# Patient Record
Sex: MALE | Race: WHITE
[De-identification: names, ages, dates, MRNs, and addresses within clinical notes are randomized per-mention and may not be internally consistent; named-entity substitution may affect disease eponyms.]

---

## 2020-02-18 ENCOUNTER — HOSPITAL ENCOUNTER (EMERGENCY)
Dept: HOSPITAL 25 - ED | Age: 80
Discharge: HOME | End: 2020-02-18
Payer: MEDICARE

## 2020-02-18 VITALS — DIASTOLIC BLOOD PRESSURE: 80 MMHG | SYSTOLIC BLOOD PRESSURE: 155 MMHG

## 2020-02-18 DIAGNOSIS — Z88.8: ICD-10-CM

## 2020-02-18 DIAGNOSIS — R73.03: ICD-10-CM

## 2020-02-18 DIAGNOSIS — Z86.718: ICD-10-CM

## 2020-02-18 DIAGNOSIS — Z79.01: ICD-10-CM

## 2020-02-18 DIAGNOSIS — Z95.2: ICD-10-CM

## 2020-02-18 DIAGNOSIS — I10: ICD-10-CM

## 2020-02-18 DIAGNOSIS — E87.1: Primary | ICD-10-CM

## 2020-02-18 DIAGNOSIS — R60.0: ICD-10-CM

## 2020-02-18 DIAGNOSIS — N39.0: ICD-10-CM

## 2020-02-18 DIAGNOSIS — Z95.810: ICD-10-CM

## 2020-02-18 DIAGNOSIS — Z87.891: ICD-10-CM

## 2020-02-18 LAB
ALBUMIN SERPL BCG-MCNC: 4 G/DL (ref 3.2–5.2)
ALBUMIN/GLOB SERPL: 1.3 {RATIO} (ref 1–3)
ALP SERPL-CCNC: 67 U/L (ref 34–104)
ALT SERPL W P-5'-P-CCNC: 20 U/L (ref 7–52)
ANION GAP SERPL CALC-SCNC: 5 MMOL/L (ref 2–11)
AST SERPL-CCNC: 25 U/L (ref 13–39)
BASOPHILS # BLD AUTO: 0 10^3/UL (ref 0–0.2)
BUN SERPL-MCNC: 13 MG/DL (ref 6–24)
BUN/CREAT SERPL: 11.9 (ref 8–20)
CALCIUM SERPL-MCNC: 9.3 MG/DL (ref 8.6–10.3)
CHLORIDE SERPL-SCNC: 95 MMOL/L (ref 101–111)
EOSINOPHIL # BLD AUTO: 0.2 10^3/UL (ref 0–0.6)
GLOBULIN SER CALC-MCNC: 3.1 G/DL (ref 2–4)
GLUCOSE SERPL-MCNC: 114 MG/DL (ref 70–100)
HCO3 SERPL-SCNC: 27 MMOL/L (ref 22–32)
HCT VFR BLD AUTO: 42 % (ref 42–52)
HGB BLD-MCNC: 14.4 G/DL (ref 14–18)
LYMPHOCYTES # BLD AUTO: 0.9 10^3/UL (ref 1–4.8)
MAGNESIUM SERPL-MCNC: 1.9 MG/DL (ref 1.9–2.7)
MCH RBC QN AUTO: 31 PG (ref 27–31)
MCHC RBC AUTO-ENTMCNC: 35 G/DL (ref 31–36)
MCV RBC AUTO: 89 FL (ref 80–94)
MONOCYTES # BLD AUTO: 0.9 10^3/UL (ref 0–0.8)
NEUTROPHILS # BLD AUTO: 3.9 10^3/UL (ref 1.5–7.7)
NRBC # BLD AUTO: 0 10^3/UL
NRBC BLD QL AUTO: 0
PLATELET # BLD AUTO: 234 10^3/UL (ref 150–450)
POTASSIUM SERPL-SCNC: 4.1 MMOL/L (ref 3.5–5)
PROT SERPL-MCNC: 7.1 G/DL (ref 6.4–8.9)
RBC # BLD AUTO: 4.67 10^6 /UL (ref 4.18–5.48)
RBC UR QL AUTO: (no result)
SODIUM SERPL-SCNC: 127 MMOL/L (ref 135–145)
WBC # BLD AUTO: 5.9 10^3/UL (ref 3.5–10.8)
WBC UR QL AUTO: (no result)

## 2020-02-18 PROCEDURE — 87086 URINE CULTURE/COLONY COUNT: CPT

## 2020-02-18 PROCEDURE — 87186 SC STD MICRODIL/AGAR DIL: CPT

## 2020-02-18 PROCEDURE — 81015 MICROSCOPIC EXAM OF URINE: CPT

## 2020-02-18 PROCEDURE — 83735 ASSAY OF MAGNESIUM: CPT

## 2020-02-18 PROCEDURE — 84300 ASSAY OF URINE SODIUM: CPT

## 2020-02-18 PROCEDURE — 36415 COLL VENOUS BLD VENIPUNCTURE: CPT

## 2020-02-18 PROCEDURE — 96360 HYDRATION IV INFUSION INIT: CPT

## 2020-02-18 PROCEDURE — 80053 COMPREHEN METABOLIC PANEL: CPT

## 2020-02-18 PROCEDURE — 99284 EMERGENCY DEPT VISIT MOD MDM: CPT

## 2020-02-18 PROCEDURE — 85025 COMPLETE CBC W/AUTO DIFF WBC: CPT

## 2020-02-18 PROCEDURE — 87077 CULTURE AEROBIC IDENTIFY: CPT

## 2020-02-18 PROCEDURE — 81003 URINALYSIS AUTO W/O SCOPE: CPT

## 2020-02-18 NOTE — XMS REPORT
Patient Summary Document

 Created on:January 15, 2020



Patient:PARISH HOUSE Unknown

Sex:Male

:1940

External Reference #:[id number]





Demographics







 Address  [facility] [place] [address]

 

 Home Phone  [phone]

 

 Preferred Language  English

 

 Marital Status  Unknown

 

 [facility] Affiliation  Unknown

 

 Race  Unknown

 

 Ethnic Group  Unknown









Author







 Organization  [facility]









Support







 Name  Relationship  Address  Phone

 

 ZI, Unknown Name  NextOfBernardino  Unknown Address  [phone]









Care Team Providers







 Name  Role  Phone

 

 Unavailable  Unavailable  Unavailable









Problems







 Condition  Condition  Condition  Status  Onset  Resolution  Last  Treating  
Comments



 Name  Details  Category    Date  Date  Treatment  Clinician  



             Date    

 

 Hypertensiv  Hypertensiv  Diagnosis  Active        Elmer  



 e heart  e heart            Smith  



 disease  disease            [id number]  



 with heart  with heart              



 failure  failure              

 

 Chronic  Chronic  Diagnosis  Active        Elmer  



 systolic  systolic            Haider  



 (congestive  (congestive            [id number]  



 ) heart  ) heart              



 failure  failure              

 

 Paroxysmal  Paroxysmal  Diagnosis  Active        Elmer  



 atrial  atrial            Haider  



 fibrillatio  fibrillatio            [id number]  



 n  n              

 

 Type 2  Type 2  Diagnosis  Active        Elmer  



 diabetes  diabetes            Smith  



 mellitus  mellitus            [id number]  



 without  without              



 complicatio  complicatio              



 ns  ns              

 

 Diverticuli  Diverticuli  Diagnosis  Active        Elmer  



 tis of  tis of            Smith  



 intestine,  intestine,            [id number]  



 part  part              



 unspecified  unspecified              



 , without  , without              



 perforation  perforation              



 or abscess  or abscess              



 without  without              



 bleeding  bleeding              

 

 Anemia,  Anemia,  Diagnosis  Active        Elmer  



 unspecified  unspecified            Haider  



               [id number]  

 

 Benign  Benign  Diagnosis  Active        Elmer  



 prostatic  prostatic            Smith  



 hyperplasia  hyperplasia            [id number]  



 without  without              



 lower  lower              



 urinary  urinary              



 tract  tract              



 symptoms  symptoms              

 

 Lymphocytop  Lymphocytop  Diagnosis  Active        Elmer  



 enia  enia            Haider  



               [id number]  

 

 Encounter  Encounter  Diagnosis  Active        Elmer  



 for  alka Maloney  



 attention  attention            [id number]  



 to  to              



 colostomy  colostomy              

 

 Long term  Long term  Diagnosis  Active        Elmer  



 (current)  (current)            Haider  



 use of  use of            [id number]  



 anticoagula  anticoagula              



 nts  nts              

 

 Personal  Personal  Diagnosis  Active        Elmer  



 history of  history of            Haider  



 transient  transient            [id number]  



 ischemic  ischemic              



 attack  attack              



 (TIA), and  (TIA), and              



 cerebral  cerebral              



 infarction  infarction              



 without  without              



 residual  residual              



 deficits  deficits              

 

 Presence of  Presence of  Diagnosis  Active        Elmer  



 cardiac  cardiac            Haider  



 pacemaker  pacemaker            [id number]  

 

 Presence of  Presence of  Diagnosis  Active        Elmer  



 prosthetic  prosthetic            Smith  



 heart valve  heart valve            [id number]  

 

 Personal  Personal  Diagnosis  Active        Elmer  



 history of  history of            Smith  



 nicotine  nicotine            [id number]  



 dependence  dependence              

 

 Problems  Problems  Diagnosis  Active        Elmer  



 related to  related to            [place]ider  



 living  living            [id number]  



 alone  alone              

 

 Pain  frequent  Pain Mgmt  Resolve  2019    Patricia  



   pain    d    10:55:00    Olyphant  



         10:30:      [id number]  



         00        

 

 Cardio  edema  Cardiovasc  Active  2019      Patricia  



     ular          Olyphant  



         10:30:      [id number]  



         00        

 

 Cardio  knowledge/s  Cardiovasc  Active  2019      Patricia  



   kill  ular          [place]phant  



   deficit: pt      10:30:      [id number]  



         00        

 

 Respiratory  dyspnea  Respirator  Resolve  2019    Patricia  



   present  y  d    10:45:00    Olyphant  



         10:30:      [id number]  



         00        

 

 Endo/Hema  newly  Endo/Hema  Resolve  2019    Patricia  



   diagnosed    d    09:45:00    Olyphant  



   diabetes      10:30:      [id number]  



         00        

 

 Endo/Hema  diabetic  Endo/Hema  Resolve  2019    Patricia  



   foot care    d    09:45:00    Olyphant  



         10:30:      [id number]  



         00        

 

 Endo/Hema  anti-coagul  Endo/Hema  Resolve  2019    Patricia  



   ation    d    09:45:00    Olyphant  



   therapy      10:30:      [id number]  



         00        

 

 Sensory  impaired  Sensory  Resolve  2019    Patricia  



   hearing    d    10:45:00    Olyphant  



         10:30:      [id number]  



         00        

 

 Integument  skin  Integument  Resolve  2019    Patricia  



   integrity    d    11:40:00    Olyphant  



   risk      10:30:      [id number]  



         00        

 

 Nutrition  nutritional  Nutrition  Resolve  2019    Patricia  



   restriction    d    11:40:00    Olyphant  



   s      10:30:      [id number]  



         00        

 

 Elimination  ostomy  Eliminatio  Resolve  2019    Patricia  



   present  n  d    11:40:00    [place]  



         10:30:      [id number]  



         00        

 

 Neuro  confusion  Neuro/Emot  Resolve  2019    Patricia  



   present  ion  d    10:45:00    O[place]phant  



         10:30:      [id number]  



         00        

 

 Neuro  impaired  Neuro/Emot  Resolve  2019    Patricia  



   decision-ma  ion  d    10:45:00    O[place]phant  



   felicia      10:30:      [id number]  



         00        

 

 Activity  ADL  Activity  Resolve  2019    Patricia  



   assistance    d    10:45:00    Harper  



   required      10:30:      [id number]  



         00        

 

 Activity  self-care  Activity  Resolve  2019    Patricia  



   deficit    d    10:45:00    Harper  



         10:30:      [id number]  



         00        

 

 Safety  structural  Safety  Resolve  2019    Patricia  



   barriers    d    10:55:00    [place]phant  



   present      10:30:      [id number]  



         00        

 

 Safety  fall risk  Safety  Resolve  2019    Patricia  



   factor    d    10:55:00    O[place]phant  



   present      10:30:      [id number]  



         00        

 

 Safety  risk for  Safety  Resolve  2019    Patricia  



   hospitaliza    d    10:55:00    [place]  



   tion      10:30:      [id number]  



         00        

 

 Safety  can be left  Safety  Active  2019      Patricia  



   alone for            Olyphant  



   only short      10:30:      [id number]  



   periods      00        

 

 Safety  safety  Safety  Resolve  2019    QUALITY  



   hazards    d    10:55:00    REALTIME  



   present      10:30:        



         00        

 

 Medication  oral med  Meds  Resolve  2019    Patricia  



   assistance    d    10:55:00    Harper  



   required      10:30:      [id number]  



         00        

 

 Medication  knowledge/s  Meds  Resolve  2019    Patricia  



   kill    d    10:55:00    Olyphant  



   deficit: pt      10:30:      [id number]  



         00        

 

 Musculoskel  transfer  Musculoske  Resolve  2019    Patricia  



 etal  assistance  letal  d    10:45:00    Harper  



   required      10:30:      [id number]  



         00        

 

 Musculoskel  requires  Musculoske  Resolve  2019    Patricia  



 etal  human  letal  d    10:45:00    Harper  



   assist to      10:30:      [id number]  



   leave home      00        

 

 Cath/Ostomy  k/s  Cath\Ostom  Resolve  2019    Patricia  



 /GI  deficit:  y\GI Care  d    11:40:00    Olyphant  



   cath/ost/GI      10:30:      [id number]  



   care - pt      00        

 

 Balance/End  balance/  PT/OT:  Resolve  2019    Shyam kimance  rdination  Balance/En  d    10:45:00    Kobziewicz  



   deficit  durance    12:07:      [id number]  



         00        

 

 OT: Self  self-care  OT:  Resolve  2019    Shyam  



 Care  deficit  Self-Care  d    10:45:00    Kobziewicz  



         12:07:      [id number]  



         00        

 

 Gait/Locomo  stair  PT/OT:  Resolve  2019    Shyam  



 tion  management  Gait/Locom  d    10:45:00    Kobziewicz  



 problems  req  otion    12:07:      [id number]  



         00        

 

 Gait/Locomo  gait  PT/OT:  Resolve  2019    Shyam  



 tion  deficit  Gait/Locom  d    10:45:00    Kobziewicz  



 problems    otion    12:07:      [id number]  



         00        

 

 Elimination  ostomy  Eliminatio  Resolve  2019    Elmer  



   present  n  d  1-15  10:55:00    Haider  



         09:55:      [id number]  



         00        

 

 Elimination  urinary  Eliminatio  Resolve  2019    Elmer  



   incontinenc  n  d  2-11  10:45:00    Haider gill      10:45:      [id number]  



         00        

 

 Elimination  ostomy  Eliminatio  Resolve  2019    Elmer  



   present  n  d  2-11  10:45:00    [place]ider  



         10:45:      [id number]  



         00        

 

 Safety  risk for  Safety  Active  2019      Elmer  



   hospitaliza            [place]ider  



   tion      10:50:      [id number]  



         00        

 

 Activity  ADL  Activity  Unknown  2019      Elmer  



   assistance            Maloney  



   required      10:45:      [id number]  



         00        

 

 Safety  fall risk  Safety  Active  2019      Elmer  



   factor            Maloney  



   present      10:45:      [id number]  



         00        







Allergies, Adverse Reactions, Alerts







 Allergy  Allergy  Status  Severity  Reaction(s)  Onset  Inactive  Treating  
Comments



 Name  Type        Date  Date  Clinician  

 

 Unknown  None  Active  Unknown  None      Unknown  No Known



                 Allergies



                 For This



                 Patient







Medications







 Ordered  Filled  Start  Stop  Current  Ordering  Indication  Dosage  Frequency
  Signature  Comments  Components



 Medication  Medication  Date  Date  Medication?  Clinician        (SIG)    



 Name  Name                    

 

 Eliquis 2.5  Eliquis 2.5  2019    Yes  Coolidge    Unknown  Unknown      



 mg tablet  mg tablet        Jen JEFF            

 

 metoprolol  metoprolol  2019    Yes  Coolidge    Unknown  Unknown      



 tartrate  tartrate  1-04      Jen JEFF            



 100 mg  100 mg                    



 tablet  tablet                    

 

 Cardizem CD  Cardizem CD  2019    Yes  [place]idge    Unknown  Unknown      



 120 mg  120 mg  1-04      eJn JEFF            



 capsule,ext  capsule,ext                    



 ended  ended                    



 release  release                    

 

 multivitami  multivitami  2019    Yes  [place]    Unknown  Unknown      



 n with  n with  -      Jen JEFF            



 minerals  minerals                    



 tablet  tablet                    

 

 Co Q-10 150  Co Q-10 150  2019    Yes  [place]    Unknown  Unknown      



 mg capsule  mg capsule  1-04      Jen JEFF            

 

 niacin ER  niacin ER  2019    Yes  [place]idge    Unknown  Unknown      



 250 mg  250 mg  -04      Jen JEFF            



 tablet,exte  tablet,exte                    



 nded  nded                    



 release  release                    

 

 iron 27mg  iron 27mg  2019    Yes  [place]idge    Unknown  Unknown      



     1-04      Jen JEFF            

 

 pravastatin  pravastatin  2019    Yes  Coolidge    Unknown  Unknown      



 40 mg  40 mg  -15      Jen JEFF            



 tablet  tablet                    







Vital Signs







 Vital Name  Observation Time  Observation Value  Comments

 

 SYSTOLIC mm[Hg]  2020 18:09:52  146 mm[Hg] mm[Hg]  Method: Sit

 

 SYSTOLIC mm[Hg]  2019 18:08:41  128 mm[Hg] mm[Hg]  Method: Stand

 

 DIASTOLIC mm[Hg]  2020 18:09:52  72 mm[Hg] mm[Hg]  Method: Sit

 

 DIASTOLIC mm[Hg]  2019 18:08:41  70 mm[Hg] mm[Hg]  Method: Stand

 

 PULSE  2020 18:09:52  76 /min /min  

 

 RESP RATE  2020 18:09:52  16 /min /min  

 

 TEMP  2020 18:09:52  98.1 [degF]  







Procedures

This patient has no known procedures.



Results

This patient has no known results.

## 2020-02-18 NOTE — XMS REPORT
Patient Summary Document

 Created on:2020



Patient:PARISH HOUSE Unknown

Sex:Male

:1940

External Reference #:[id number]





Demographics







 [place] [address]

 

 Home Phone  [phone]

 

 Preferred Language  English

 

 Marital Status  Unknown

 

 Sabianist Affiliation  Unknown

 

 Race  Unknown

 

 Ethnic Group  Unknown









Author







 Organization  [facility]









Support







 Name  Relationship  Address  Phone

 

 ZI, Unknown Name  NextOfBernardino  Unknown Address  [phone]









Care Team Providers







 Name  Role  Phone

 

 Unavailable  Unavailable  Unavailable









Problems







 Condition  Condition  Condition  Status  Onset  Resolution  Last  Treating  
Comments



 Name  Details  Category    Date  Date  Treatment  Clinician  



             Date    

 

 Hypertensiv  Hypertensiv  Diagnosis  Active        Elmer  



 e heart  e heart            Smith  



 disease  disease            [id number]  



 with heart  with heart              



 failure  failure              

 

 Chronic  Chronic  Diagnosis  Active        Elmer  



 systolic  systolic            [place]ider  



 (congestive  (congestive            [id number]  



 ) heart  ) heart              



 failure  failure              

 

 Paroxysmal  Paroxysmal  Diagnosis  Active        Elmer  



 atrial  atrial            Haider  



 fibrillatio  fibrillatio            [id number]  



 n  n              

 

 Type 2  Type 2  Diagnosis  Active        Elmer  



 diabetes  diabetes            Smith  



 mellitus  mellitus            [id number]  



 without  without              



 complicatio  complicatio              



 ns  ns              

 

 Diverticuli  Diverticuli  Diagnosis  Active        Elmer  



 tis of  tis of            Smith  



 intestine,  intestine,            [id number]  



 part  part              



 unspecified  unspecified              



 , without  , without              



 perforation  perforation              



 or abscess  or abscess              



 without  without              



 bleeding  bleeding              

 

 Anemia,  Anemia,  Diagnosis  Active        Elmer  



 unspecified  unspecified            [place]ider  



               [id number]  

 

 Benign  Benign  Diagnosis  Active        Elmre  



 prostatic  prostatic            Smith  



 hyperplasia  hyperplasia            [id number]  



 without  without              



 lower  lower              



 urinary  urinary              



 tract  tract              



 symptoms  symptoms              

 

 Lymphocytop  Lymphocytop  Diagnosis  Active        Elmer  



 enia  enia            Haider  



               [id number]  

 

 Encounter  Encounter  Diagnosis  Active        Elmer  



 for  alka Maloney  



 attention  attention            [id number]  



 to  to              



 colostomy  colostomy              

 

 Long term  Long term  Diagnosis  Active        Elmer  



 (current)  (current)            Haider  



 use of  use of            [id number]  



 anticoagula  anticoagula              



 nts  nts              

 

 Personal  Personal  Diagnosis  Active        Elmer  



 history of  history of            [place]ider  



 transient  transient            [id number]  



 ischemic  ischemic              



 attack  attack              



 (TIA), and  (TIA), and              



 cerebral  cerebral              



 infarction  infarction              



 without  without              



 residual  residual              



 deficits  deficits              

 

 Presence of  Presence of  Diagnosis  Active        Elmer  



 cardiac  cardiac            [place]ider  



 pacemaker  pacemaker            [id number]  

 

 Presence of  Presence of  Diagnosis  Active        Elmer  



 prosthetic  prosthetic            Smith  



 heart valve  heart valve            [id number]  

 

 Personal  Personal  Diagnosis  Active        Elmer  



 history of  history of            Smith  



 nicotine  nicotine            [id number]  



 dependence  dependence              

 

 Problems  Problems  Diagnosis  Active        Elmer  



 related to  related to            [place]  



 living  living            [id number]  



 alone  alone              

 

 Pain  frequent  Pain Mgmt  Resolve  2019    Patricia  



   pain    d    10:55:00    McDaniels  



         10:30:      [id number]  



         00        

 

 Cardio  edema  Cardiovasc  Active  2019      Patricia  



     ular          McDaniels  



         10:30:      [id number]  



         00        

 

 Cardio  knowledge/s  Cardiovasc  Active  2019      Patricia  



   kill  ular          McDaniels  



   deficit: pt      10:30:      [id number]  



         00        

 

 Respiratory  dyspnea  Respirator  Resolve  2019    Patricia  



   present  y  d    10:45:00    McDaniels  



         10:30:      [id number]  



         00        

 

 Endo/Hema  newly  Endo/Hema  Resolve  2019    Patricia  



   diagnosed    d    09:45:00    McDaniels  



   diabetes      10:30:      [id number]  



         00        

 

 Endo/Hema  diabetic  Endo/Hema  Resolve  2019    Patricia  



   foot care    d    09:45:00    McDaniels  



         10:30:      [id number]  



         00        

 

 Endo/Hema  anti-coagul  Endo/Hema  Resolve  2019    Patricia  



   ation    d    09:45:00    McDaniels  



   therapy      10:30:      [id number]  



         00        

 

 Sensory  impaired  Sensory  Resolve  2019    Patricia  



   hearing    d    10:45:00    McDaniels  



         10:30:      [id number]  



         00        

 

 Integument  skin  Integument  Resolve  2019    Patricia  



   integrity    d    11:40:00    McDaniels  



   risk      10:30:      [id number]  



         00        

 

 Nutrition  nutritional  Nutrition  Resolve  2019    Patricia  



   restriction    d    11:40:00    McDaniels  



   s      10:30:      [id number]  



         00        

 

 Elimination  ostomy  Eliminatio  Resolve  2019    Patricia  



   present  n  d    11:40:00    McDaniels  



         10:30:      [id number]  



         00        

 

 Neuro  confusion  Neuro/Emot  Resolve  2019    Patricia  



   present  ion  d    10:45:00    McDaniels  



         10:30:      [id number]  



         00        

 

 Neuro  impaired  Neuro/Emot  Resolve  2019    Patricia  



   decision-ma  ion  d    10:45:00    McDaniels  



   felicia      10:30:      [id number]  



         00        

 

 Activity  ADL  Activity  Resolve  2019    Patricia  



   assistance    d    10:45:00    Harper  



   required      10:30:      [id number]  



         00        

 

 Activity  self-care  Activity  Resolve  2019    Patricia  



   deficit    d    10:45:00    Harper  



         10:30:      [id number]  



         00        

 

 Safety  structural  Safety  Resolve  2019    Patricia  



   barriers    d    10:55:00    McDaniels  



   present      10:30:      [id number]  



         00        

 

 Safety  fall risk  Safety  Resolve  2019    Patricia  



   factor    d    10:55:00    McDaniels  



   present      10:30:      [id number]  



         00        

 

 Safety  risk for  Safety  Resolve  2019    Patricia  



   hospitaliza    d    10:55:00    McDaniels  



   tion      10:30:      [id number]  



         00        

 

 Safety  can be left  Safety  Active  2019      Patricia  



   alone for            McDaniels  



   only short      10:30:      [id number]  



   periods      00        

 

 Safety  safety  Safety  Resolve  2019    QUALITY  



   hazards    d    10:55:00    REALTIME  



   present      10:30:        



         00        

 

 Medication  oral med  Meds  Resolve  2019    Patricia  



   assistance    d    10:55:00    Harper  



   required      10:30:      [id number]  



         00        

 

 Medication  knowledge/s  Meds  Resolve  2019    Patricia  



   kill    d    10:55:00    McDaniels  



   deficit: pt      10:30:      [id number]  



         00        

 

 Musculoskel  transfer  Musculoske  Resolve  2019    Patricia  



 etal  assistance  letal  d    10:45:00    Harper  



   required      10:30:      [id number]  



         00        

 

 Musculoskel  requires  Musculoske  Resolve  2019    Patricia  



 etal  human  letal  d    10:45:00    [place]  



   assist to      10:30:      [id number]  



   leave home      00        

 

 Cath/Ostomy  k/s  Cath\Ostom  Resolve  2019    Patricia  



 /GI  deficit:  y\GI Care  d    11:40:00    McDaniels  



   cath/ost/GI      10:30:      [id number]  



   care - pt      00        

 

 Balance/End  balance/  PT/OT:  Resolve  2019    Shyam kimance  rdination  Balance/En  d    10:45:00    Kobziewicz  



   deficit  durance    12:07:      [id number]  



         00        

 

 OT: Self  self-care  OT:  Resolve  2019    Shyam  



 Care  deficit  Self-Care  d    10:45:00    Kobziewicz  



         12:07:      [id number]  



         00        

 

 Gait/Locomo  stair  PT/OT:  Resolve  2019    Shyam  



 tion  management  Gait/Locom  d    10:45:00    Kobziewicz  



 problems  req  otion    12:07:      [id number]  



         00        

 

 Gait/Locomo  gait  PT/OT:  Resolve  2019    Shyam  



 tion  deficit  Gait/Locom  d    10:45:00    Kobziewicz  



 problems    otion    12:07:      [id number]  



         00        

 

 Elimination  ostomy  Eliminatio  Resolve  2019    Elmer  



   present  n  d  1-15  10:55:00    [place]  



         09:55:      [id number]  



         00        

 

 Elimination  urinary  Eliminatio  Resolve  2019    Elmer  



   incontinenc  n  d  2-11  10:45:00    [place] gill      10:45:      [id number]  



         00        

 

 Elimination  ostomy  Eliminatio  Resolve  2019    Elmer  



   present  n  d  2-11  10:45:00    [place]  



         10:45:      [id number]  



         00        

 

 Safety  risk for  Safety  Active  2019      Elmer  



   hospitaliza            [place]  



   tion      10:50:      [id number]  



         00        

 

 Activity  ADL  Activity  Unknown  2019      Elmer  



   assistance            Maloney  



   required      10:45:      [id number]  



         00        

 

 Safety  fall risk  Safety  Active  2019      Elmer  



   factor            Maloney  



   present      10:45:      [id number]  



         00        







Allergies, Adverse Reactions, Alerts







 Allergy  Allergy  Status  Severity  Reaction(s)  Onset  Inactive  Treating  
Comments



 Name  Type        Date  Date  Clinician  

 

 Unknown  None  Active  Unknown  None      Unknown  No Known



                 Allergies



                 For This



                 Patient







Medications







 Ordered  Filled  Start  Stop  Current  Ordering  Indication  Dosage  Frequency
  Signature  Comments  Components



 Medication  Medication  Date  Date  Medication?  Clinician        (SIG)    



 Name  Name                    

 

 Eliquis 2.5  Eliquis 2.5  2019    Yes  [place]    Unknown  Unknown      



 mg tablet  mg tablet        Jen JEFF            

 

 metoprolol  metoprolol  2019    Yes  [place]    Unknown  Unknown      



 tartrate  tartrate  1-04      Jen JEFF            



 100 mg  100 mg                    



 tablet  tablet                    

 

 Cardizem CD  Cardizem CD  2019    Yes  [place]    Unknown  Unknown      



 120 mg  120 mg  1-04      Jen JEFF            



 capsule,ext  capsule,ext                    



 ended  ended                    



 release  release                    

 

 multivitami  multivitami  2019    Yes  [place]    Unknown  Unknown      



 n with  n with  -      Jen JEFF            



 minerals  minerals                    



 tablet  tablet                    

 

 Co Q-10 150  Co Q-10 150  2019    Yes  [place]    Unknown  Unknown      



 mg capsule  mg capsule  -04      Jen JEFF            

 

 niacin ER  niacin ER  2019    Yes  [place]    Unknown  Unknown      



 250 mg  250 mg  -04      Jen JEFF            



 tablet,exte  tablet,exte                    



 nded  nded                    



 release  release                    

 

 iron 27mg  iron 27mg  2019    Yes  [place]    Unknown  Unknown      



     1-04      Jen JEFF            

 

 pravastatin  pravastatin  2019    Yes  [place]    Unknown  Unknown      



 40 mg  40 mg  -15      Jen JEFF            



 tablet  tablet                    







Vital Signs







 Vital Name  Observation Time  Observation Value  Comments

 

 SYSTOLIC mm[Hg]  2020 18:09:45  112 mm[Hg] mm[Hg]  Method: Sit

 

 SYSTOLIC mm[Hg]  2019 18:08:41  128 mm[Hg] mm[Hg]  Method: Stand

 

 DIASTOLIC mm[Hg]  2020 18:09:45  68 mm[Hg] mm[Hg]  Method: Sit

 

 DIASTOLIC mm[Hg]  2019 18:08:41  70 mm[Hg] mm[Hg]  Method: Stand

 

 PULSE  2020 18:09:45  72 /min /min  

 

 RESP RATE  2020 18:09:45  16 /min /min  

 

 TEMP  2020 18:09:45  98.1 [degF]  







Procedures

This patient has no known procedures.



Results

This patient has no known results.

## 2020-02-18 NOTE — XMS REPORT
Patient Summary Document

 Created on:2020



Patient:PARISH HOUSE Unknown

Sex:Male

:1940

External Reference #:[id number]





Demographics







 Address  [facility] [address] [place] [address]

 

 Home Phone  [phone]

 

 Preferred Language  English

 

 Marital Status  Unknown

 

 Mosque Affiliation  Unknown

 

 Race  Unknown

 

 Ethnic Group  Unknown









Author







 Organization  [facility]









Support







 Name  Relationship  Address  Phone

 

 ZI, Unknown Name  NextOfBernardino  Unknown Address  [phone]









Care Team Providers







 Name  Role  Phone

 

 Unavailable  Unavailable  Unavailable









Problems







 Condition  Condition  Condition  Status  Onset  Resolution  Last  Treating  
Comments



 Name  Details  Category    Date  Date  Treatment  Clinician  



             Date    

 

 Hypertensiv  Hypertensiv  Diagnosis  Active        Elmer  



 e heart  e heart            Smith  



 disease  disease            [id number]  



 with heart  with heart              



 failure  failure              

 

 Chronic  Chronic  Diagnosis  Active        Elmer  



 systolic  systolic            Haider  



 (congestive  (congestive            [id number]  



 ) heart  ) heart              



 failure  failure              

 

 Paroxysmal  Paroxysmal  Diagnosis  Active        Elmer  



 atrial  atrial            Haider  



 fibrillatio  fibrillatio            [id number]  



 n  n              

 

 Type 2  Type 2  Diagnosis  Active        Elmer  



 diabetes  diabetes            Smith  



 mellitus  mellitus            [id number]  



 without  without              



 complicatio  complicatio              



 ns  ns              

 

 Diverticuli  Diverticuli  Diagnosis  Active        Elmer  



 tis of  tis of            Smith  



 intestine,  intestine,            [id number]  



 part  part              



 unspecified  unspecified              



 , without  , without              



 perforation  perforation              



 or abscess  or abscess              



 without  without              



 bleeding  bleeding              

 

 Anemia,  Anemia,  Diagnosis  Active        Elmer  



 unspecified  unspecified            Haider  



               [id number]  

 

 Benign  Benign  Diagnosis  Active        Elmer  



 prostatic  prostatic            Smith  



 hyperplasia  hyperplasia            [id number]  



 without  without              



 lower  lower              



 urinary  urinary              



 tract  tract              



 symptoms  symptoms              

 

 Lymphocytop  Lymphocytop  Diagnosis  Active        Elmer  



 enia  enia            Haider  



               [id number]  

 

 Encounter  Encounter  Diagnosis  Active        Elmer  



 for  alka Maloney  



 attention  attention            [id number]  



 to  to              



 colostomy  colostomy              

 

 Long term  Long term  Diagnosis  Active        Elmer  



 (current)  (current)            Haider  



 use of  use of            [id number]  



 anticoagula  anticoagula              



 nts  nts              

 

 Personal  Personal  Diagnosis  Active        Elmer  



 history of  history of            Haider  



 transient  transient            [id number]  



 ischemic  ischemic              



 attack  attack              



 (TIA), and  (TIA), and              



 cerebral  cerebral              



 infarction  infarction              



 without  without              



 residual  residual              



 deficits  deficits              

 

 Presence of  Presence of  Diagnosis  Active        Elmer  



 cardiac  cardiac            [place]ider  



 pacemaker  pacemaker            [id number]  

 

 Presence of  Presence of  Diagnosis  Active        Elemr  



 prosthetic  prosthetic            Smith  



 heart valve  heart valve            [id number]  

 

 Personal  Personal  Diagnosis  Active        Elmer  



 history of  history of            Smith  



 nicotine  nicotine            [id number]  



 dependence  dependence              

 

 Problems  Problems  Diagnosis  Active        Elmer  



 related to  related to            [place]  



 living  living            [id number]  



 alone  alone              

 

 Pain  frequent  Pain Mgmt  Resolve  2019    Patricia  



   pain    d    10:55:00    Traver  



         10:30:      [id number]  



         00        

 

 Cardio  edema  Cardiovasc  Active  2019      Patricia  



     ular          Traver  



         10:30:      [id number]  



         00        

 

 Cardio  knowledge/s  Cardiovasc  Active  2019      Patricia  



   kill  ular          Traver  



   deficit: pt      10:30:      [id number]  



         00        

 

 Respiratory  dyspnea  Respirator  Resolve  2019    Patricia  



   present  y  d    10:45:00    Traver  



         10:30:      [id number]  



         00        

 

 Endo/Hema  newly  Endo/Hema  Resolve  2019    Patricia  



   diagnosed    d    09:45:00    Traver  



   diabetes      10:30:      [id number]  



         00        

 

 Endo/Hema  diabetic  Endo/Hema  Resolve  2019    Patricia  



   foot care    d    09:45:00    Traver  



         10:30:      [id number]  



         00        

 

 Endo/Hema  anti-coagul  Endo/Hema  Resolve  2019    Patricia  



   ation    d    09:45:00    Traver  



   therapy      10:30:      [id number]  



         00        

 

 Sensory  impaired  Sensory  Resolve  2019    Patricia  



   hearing    d    10:45:00    Traver  



         10:30:      [id number]  



         00        

 

 Integument  skin  Integument  Resolve  2019    Patricia  



   integrity    d    11:40:00    Traver  



   risk      10:30:      [id number]  



         00        

 

 Nutrition  nutritional  Nutrition  Resolve  2019    Patricia  



   restriction    d    11:40:00    Traver  



   s      10:30:      [id number]  



         00        

 

 Elimination  ostomy  Eliminatio  Resolve  2019    Patricia  



   present  n  d    11:40:00    Traver  



         10:30:      [id number]  



         00        

 

 Neuro  confusion  Neuro/Emot  Resolve  2019    Patricia  



   present  ion  d    10:45:00    Traver  



         10:30:      [id number]  



         00        

 

 Neuro  impaired  Neuro/Emot  Resolve  2019    Patricia  



   decision-ma  ion  d    10:45:00    Traver  



   felicia      10:30:      [id number]  



         00        

 

 Activity  ADL  Activity  Resolve  2019    Patricia  



   assistance    d    10:45:00    Harper  



   required      10:30:      [id number]  



         00        

 

 Activity  self-care  Activity  Resolve  2019    Patricia  



   deficit    d    10:45:00    Harper  



         10:30:      [id number]  



         00        

 

 Safety  structural  Safety  Resolve  2019    Patricia  



   barriers    d    10:55:00    Traver  



   present      10:30:      [id number]  



         00        

 

 Safety  fall risk  Safety  Resolve  2019    Patricia  



   factor    d    10:55:00    Traver  



   present      10:30:      [id number]  



         00        

 

 Safety  risk for  Safety  Resolve  2019    Patricia  



   hospitaliza    d    10:55:00    Traver  



   tion      10:30:      [id number]  



         00        

 

 Safety  can be left  Safety  Active  2019      Patricia  



   alone for            Traver  



   only short      10:30:      [id number]  



   periods      00        

 

 Safety  safety  Safety  Resolve  2019    QUALITY  



   hazards    d    10:55:00    REALTIME  



   present      10:30:        



         00        

 

 Medication  oral med  Meds  Resolve  2019    Patricia  



   assistance    d    10:55:00    Harper  



   required      10:30:      [id number]  



         00        

 

 Medication  knowledge/s  Meds  Resolve  2019    Patricia  



   kill    d    10:55:00    Traver  



   deficit: pt      10:30:      [id number]  



         00        

 

 Musculoskel  transfer  Musculoske  Resolve  2019    Patricia  



 etal  assistance  letal  d    10:45:00    Harper  



   required      10:30:      [id number]  



         00        

 

 Musculoskel  requires  Musculoske  Resolve  2019    Patricia  



 etal  human  letal  d    10:45:00    Harper  



   assist to      10:30:      [id number]  



   leave home      00        

 

 Cath/Ostomy  k/s  Cath\Ostom  Resolve  2019    Patricia  



 /GI  deficit:  y\GI Care  d    11:40:00    Traver  



   cath/ost/GI      10:30:      [id number]  



   care - pt      00        

 

 Balance/End  balance/  PT/OT:  Resolve  2019    Shyam  



 urance  rdination  Balance/En  d    10:45:00    Mary Anneewicz  



   deficit  durance    12:07:      [id number]  



         00        

 

 OT: Self  self-care  OT:  Resolve  2019    Shyam  



 Care  deficit  Self-Care  d    10:45:00    Kobziewicz  



         12:07:      [id number]  



         00        

 

 Gait/Locomo  stair  PT/OT:  Resolve  2019    Shyam  



 tion  management  Gait/Locom  d    10:45:00    Kobziewicz  



 problems  req  otion    12:07:      [id number]  



         00        

 

 Gait/Locomo  gait  PT/OT:  Resolve  2019    Shyam  



 tion  deficit  Gait/Locom  d    10:45:00    Kobziewicz  



 problems    otion    12:07:      [id number]  



         00        

 

 Elimination  ostomy  Eliminatio  Resolve  2019    Elmer  



   present  n  d  1-15  10:55:00    Haider  



         09:55:      [id number]  



         00        

 

 Elimination  urinary  Eliminatio  Resolve  2019    Elmer  



   incontinenc  n  d  2-  10:45:00    Ha[place] gill      10:45:      [id number]  



         00        

 

 Elimination  ostomy  Eliminatio  Resolve  2019    Elmer  



   present  n  d  2-11  10:45:00    [place]  



         10:45:      [id number]  



         00        

 

 Safety  risk for  Safety  Active  2019      Elmer  



   hospitaliza            [place]  



   tion      10:50:      [id number]  



         00        

 

 Activity  ADL  Activity  Unknown  2019      Elmer  



   assistance            Maloney  



   required      10:45:      [id number]  



         00        

 

 Safety  fall risk  Safety  Active  2019      Elmer  



   factor            Maloney  



   present      10:45:      [id number]  



         00        

 

 Sensory  impaired  Sensory  Active  -      Cherrise  



   hearing            Ramona  



         11:30:      [id number]  



         00        

 

 Elimination  urinary  Eliminatio  Active  -0      Cherrise  



   incontinenc  n          [place]  



   e      11:30:      [id number]  



         00        

 

 Elimination  urinary  Eliminatio  Active        Cherrise  



   urgency  n          [place]  



         11:30:      [id number]  



         00        

 

 Elimination  ostomy  Eliminatio  Active  -0      Cherrise  



   present  n          [place]  



         11:30:      [id number]  



         00        

 

 Neuro  impaired  Neuro/Emot  Active        Cherrise  



   decision-ma  ion          [place]  



   felicia      11:30:      [id number]  



         00        

 

 Safety  structural  Safety  Active        Cherrise  



   barriers            [place]  



   present      11:30:      [id number]  



         00        

 

 Safety  sanitation  Safety  Active        Cherrise  



   hazards            [place]  



   present      11:30:      [id number]  



         00        

 

 Musculoskel  requires  Musculoske  Active        Cherrise  



 etal  human  letal          [place]  



   assist to      11:30:      [id number]  



   leave home      00        







Allergies, Adverse Reactions, Alerts







 Allergy  Allergy  Status  Severity  Reaction(s)  Onset  Inactive  Treating  
Comments



 Name  Type        Date  Date  Clinician  

 

 Unknown  None  Active  Unknown  None      Unknown  No Known



                 Allergies



                 For This



                 Patient







Medications







 Ordered  Filled  Start  Stop  Current  Ordering  Indication  Dosage  Frequency
  Signature  Comments  Components



 Medication  Medication  Date  Date  Medication?  Clinician        (SIG)    



 Name  Name                    

 

 Eliquis 2.5  Eliquis 2.5  2019    Yes  Holbrook    Unknown  Unknown      



 mg tablet  mg tablet  1-04      Jen JEFF            

 

 metoprolol  metoprolol  2019    Yes  Holbrook    Unknown  Unknown      



 tartrate  tartrate  1-04      Jen JEFF            



 100 mg  100 mg                    



 tablet  tablet                    

 

 Cardizem CD  Cardizem CD  2019    Yes  Holbrook    Unknown  Unknown      



 120 mg  120 mg  1-04      Jen JEFF            



 capsule,ext  capsule,ext                    



 ended  ended                    



 release  release                    

 

 multivitami  multivitami  2019    Yes  Holbrook    Unknown  Unknown      



 n with  n with  -      Jen JEFF            



 minerals  minerals                    



 tablet  tablet                    

 

 Co Q-10 150  Co Q-10 150  2019    Yes  Holbrook    Unknown  Unknown      



 mg capsule  mg capsule  1-04      Jen JEFF            

 

 niacin ER  niacin ER  2019    Yes  Holbrook    Unknown  Unknown      



 250 mg  250 mg  1-04      Jen JEFF            



 tablet,exte  tablet,exte                    



 nded  nded                    



 release  release                    

 

 iron 27mg  iron 27mg  2019    Yes  Holbrook    Unknown  Unknown      



     1-04      Jen JEFF            

 

 pravastatin  pravastatin  2019    Yes  Holbrook    Unknown  Unknown      



 40 mg  40 mg  -15      Jen JEFF            



 tablet  tablet                    







Vital Signs







 Vital Name  Observation Time  Observation Value  Comments

 

 SYSTOLIC mm[Hg]  2020 18:09:59  132 mm[Hg] mm[Hg]  Method: Sit

 

 SYSTOLIC mm[Hg]  2019 18:08:41  128 mm[Hg] mm[Hg]  Method: Stand

 

 DIASTOLIC mm[Hg]  2020 18:09:59  64 mm[Hg] mm[Hg]  Method: Sit

 

 DIASTOLIC mm[Hg]  2019 18:08:41  70 mm[Hg] mm[Hg]  Method: Stand

 

 PULSE  2020 18:09:59  70 /min /min  

 

 RESP RATE  2020 18:09:59  16 /min /min  

 

 TEMP  2020 18:09:59  99.7 [degF]  







Procedures

This patient has no known procedures.



Results

This patient has no known results.

## 2020-02-18 NOTE — XMS REPORT
Patient Summary Document

 Created on:2020



Patient:PARISH HOUSE Unknown

Sex:Male

:1940

External Reference #:[id number]





Demographics







 Address  [address] [place] [address]

 

 Home Phone  [phone]

 

 Preferred Language  English

 

 Marital Status  Unknown

 

 [employer] Affiliation  Unknown

 

 Race  Unknown

 

 Ethnic Group  Unknown









Author







 Organization  [facility]









Support







 Name  Relationship  Address  Phone

 

 ZI, Unknown Name  NextOfBernardino  Unknown Address  [phone]









Care Team Providers







 Name  Role  Phone

 

 Unavailable  Unavailable  Unavailable









Problems







 Condition  Condition  Condition  Status  Onset  Resolution  Last  Treating  
Comments



 Name  Details  Category    Date  Date  Treatment  Clinician  



             Date    

 

 Hypertensiv  Hypertensiv  Diagnosis  Active        Elmer  



 e heart  e heart            Smith  



 disease  disease            [id number]  



 with heart  with heart              



 failure  failure              

 

 Chronic  Chronic  Diagnosis  Active        Elmer  



 systolic  systolic            Haider  



 (congestive  (congestive            [id number]  



 ) heart  ) heart              



 failure  failure              

 

 Paroxysmal  Paroxysmal  Diagnosis  Active        Elmer  



 atrial  atrial            Haider  



 fibrillatio  fibrillatio            [id number]  



 n  n              

 

 Type 2  Type 2  Diagnosis  Active        Elmer  



 diabetes  diabetes            Smith  



 mellitus  mellitus            [id number]  



 without  without              



 complicatio  complicatio              



 ns  ns              

 

 Diverticuli  Diverticuli  Diagnosis  Active        Elmer  



 tis of  tis of            Smith  



 intestine,  intestine,            [id number]  



 part  part              



 unspecified  unspecified              



 , without  , without              



 perforation  perforation              



 or abscess  or abscess              



 without  without              



 bleeding  bleeding              

 

 Anemia,  Anemia,  Diagnosis  Active        Elmer  



 unspecified  unspecified            Haider  



               [id number]  

 

 Benign  Benign  Diagnosis  Active        Elmer  



 prostatic  prostatic            Smith  



 hyperplasia  hyperplasia            [id number]  



 without  without              



 lower  lower              



 urinary  urinary              



 tract  tract              



 symptoms  symptoms              

 

 Lymphocytop  Lymphocytop  Diagnosis  Active        Elmer  



 enia  enia            Haider  



               [id number]  

 

 Encounter  Encounter  Diagnosis  Active        Elmer  



 for  alka Maloney  



 attention  attention            [id number]  



 to  to              



 colostomy  colostomy              

 

 Long term  Long term  Diagnosis  Active        Elmer  



 (current)  (current)            Haider  



 use of  use of            [id number]  



 anticoagula  anticoagula              



 nts  nts              

 

 Personal  Personal  Diagnosis  Active        Elmer  



 history of  history of            Haider  



 transient  transient            [id number]  



 ischemic  ischemic              



 attack  attack              



 (TIA), and  (TIA), and              



 cerebral  cerebral              



 infarction  infarction              



 without  without              



 residual  residual              



 deficits  deficits              

 

 Presence of  Presence of  Diagnosis  Active        Elmer  



 cardiac  cardiac            Haider  



 pacemaker  pacemaker            [id number]  

 

 Presence of  Presence of  Diagnosis  Active        Elmer  



 prosthetic  prosthetic            Smith  



 heart valve  heart valve            [id number]  

 

 Personal  Personal  Diagnosis  Active        Elmer  



 history of  history of            Smith  



 nicotine  nicotine            [id number]  



 dependence  dependence              

 

 Problems  Problems  Diagnosis  Active        Elmer  



 related to  related to            Haider  



 living  living            [id number]  



 alone  alone              

 

 Pain  frequent  Pain Mgmt  Resolve  2019    Patricia  



   pain    d    10:55:00    Fackler  



         10:30:      [id number]  



         00        

 

 Cardio  edema  Cardiovasc  Active  2019      Patricia  



     ular          Fackler  



         10:30:      [id number]  



         00        

 

 Cardio  knowledge/s  Cardiovasc  Active  2019      Patricia  



   kill  ular          Fackler  



   deficit: pt      10:30:      [id number]  



         00        

 

 Respiratory  dyspnea  Respirator  Resolve  2019    Patricia  



   present  y  d    10:45:00    Fackler  



         10:30:      [id number]  



         00        

 

 Endo/Hema  newly  Endo/Hema  Resolve  2019    Patricia  



   diagnosed    d    09:45:00    Fackler  



   diabetes      10:30:      [id number]  



         00        

 

 Endo/Hema  diabetic  Endo/Hema  Resolve  2019    Patricia  



   foot care    d    09:45:00    Fackler  



         10:30:      [id number]  



         00        

 

 Endo/Hema  anti-coagul  Endo/Hema  Resolve  2019    Patricia  



   ation    d    09:45:00    Fackler  



   therapy      10:30:      [id number]  



         00        

 

 Sensory  impaired  Sensory  Resolve  2019    Patricia  



   hearing    d    10:45:00    Fackler  



         10:30:      [id number]  



         00        

 

 Integument  skin  Integument  Resolve  2019    Patricia  



   integrity    d    11:40:00    Fackler  



   risk      10:30:      [id number]  



         00        

 

 Nutrition  nutritional  Nutrition  Resolve  2019    Patricia  



   restriction    d    11:40:00    Fackler  



   s      10:30:      [id number]  



         00        

 

 Elimination  ostomy  Eliminatio  Resolve  2019    Patricia  



   present  n  d    11:40:00    Fackler  



         10:30:      [id number]  



         00        

 

 Neuro  confusion  Neuro/Emot  Resolve  2019    Patricia  



   present  ion  d    10:45:00    Fackler  



         10:30:      [id number]  



         00        

 

 Neuro  impaired  Neuro/Emot  Resolve  2019    Patricia  



   decision-ma  ion  d    10:45:00    Fackler  



   felicia      10:30:      [id number]  



         00        

 

 Activity  ADL  Activity  Resolve  2019    Patricia  



   assistance    d    10:45:00    Harper  



   required      10:30:      [id number]  



         00        

 

 Activity  self-care  Activity  Resolve  2019    Patricia  



   deficit    d    10:45:00    Harper  



         10:30:      [id number]  



         00        

 

 Safety  structural  Safety  Resolve  2019    Patricia  



   barriers    d    10:55:00    Fackler  



   present      10:30:      [id number]  



         00        

 

 Safety  fall risk  Safety  Resolve  2019    Patricia  



   factor    d    10:55:00    Fackler  



   present      10:30:      [id number]  



         00        

 

 Safety  risk for  Safety  Resolve  2019    Patricia  



   hospitaliza    d    10:55:00    Fackler  



   tion      10:30:      [id number]  



         00        

 

 Safety  can be left  Safety  Active  2019      Patricia  



   alone for            Fackler  



   only short      10:30:      [id number]  



   periods      00        

 

 Safety  safety  Safety  Resolve  2019    QUALITY  



   hazards    d    10:55:00    REALTIME  



   present      10:30:        



         00        

 

 Medication  oral med  Meds  Resolve  2019    Patricia  



   assistance    d    10:55:00    Harper  



   required      10:30:      [id number]  



         00        

 

 Medication  knowledge/s  Meds  Resolve  2019    Patricia  



   kill    d    10:55:00    Fackler  



   deficit: pt      10:30:      [id number]  



         00        

 

 Musculoskel  transfer  Musculoske  Resolve  2019    Patricia  



 etal  assistance  letal  d    10:45:00    Harper  



   required      10:30:      [id number]  



         00        

 

 Musculoskel  requires  Musculoske  Resolve  2019    Patricia  



 etal  human  letal  d    10:45:00    Harper  



   assist to      10:30:      [id number]  



   leave home      00        

 

 Cath/Ostomy  k/s  Cath\Ostom  Resolve  2019    Patricia  



 /GI  deficit:  y\GI Care  d    11:40:00    Fackler  



   cath/ost/GI      10:30:      [id number]  



   care - pt      00        

 

 Balance/End  balance/  PT/OT:  Resolve  2019    Shyam kimance  rdination  Balance/En  d    10:45:00    Kobziewicz  



   deficit  durance    12:07:      [id number]  



         00        

 

 OT: Self  self-care  OT:  Resolve  2019    Shyam  



 Care  deficit  Self-Care  d    10:45:00    Kobziewicz  



         12:07:      [id number]  



         00        

 

 Gait/Locomo  stair  PT/OT:  Resolve  2019    Shyam  



 tion  management  Gait/Locom  d    10:45:00    Kobziewicz  



 problems  req  otion    12:07:      [id number]  



         00        

 

 Gait/Locomo  gait  PT/OT:  Resolve  2019    Shyam  



 tion  deficit  Gait/Locom  d    10:45:00    Kobziewicz  



 problems    otion    12:07:      [id number]  



         00        

 

 Elimination  ostomy  Eliminatio  Resolve  2019    Elmer  



   present  n  d  1-15  10:55:00    Haider  



         09:55:      [id number]  



         00        

 

 Elimination  urinary  Eliminatio  Resolve  2019    Elmer  



   incontinenc  n  d  2-11  10:45:00    Haider gill      10:45:      [id number]  



         00        

 

 Elimination  ostomy  Eliminatio  Resolve  2019    Elmer  



   present  n  d  2-11  10:45:00    Haider  



         10:45:      [id number]  



         00        

 

 Safety  risk for  Safety  Active  2019      Elmer  



   hospitaliza            Haider  



   tion      10:50:      [id number]  



         00        

 

 Activity  ADL  Activity  Unknown  2019      Elmer  



   assistance            Maloney  



   required      10:45:      [id number]  



         00        

 

 Safety  fall risk  Safety  Active  2019      Elmer  



   factor            Maloney  



   present      10:45:      [id number]  



         00        







Allergies, Adverse Reactions, Alerts







 Allergy  Allergy  Status  Severity  Reaction(s)  Onset  Inactive  Treating  
Comments



 Name  Type        Date  Date  Clinician  

 

 Unknown  None  Active  Unknown  None      Unknown  No Known



                 Allergies



                 For This



                 Patient







Medications







 Ordered  Filled  Start  Stop  Current  Ordering  Indication  Dosage  Frequency
  Signature  Comments  Components



 Medication  Medication  Date  Date  Medication?  Clinician        (SIG)    



 Name  Name                    

 

 Eliquis 2.5  Eliquis 2.5  2019    Yes  Deeth    Unknown  Unknown      



 mg tablet  mg tablet        Jen JEFF            

 

 metoprolol  metoprolol  2019    Yes  Deeth    Unknown  Unknown      



 tartrate  tartrate  1-04      Jen JEFF            



 100 mg  100 mg                    



 tablet  tablet                    

 

 Cardizem CD  Cardizem CD  2019    Yes  Deeth    Unknown  Unknown      



 120 mg  120 mg  1-04      Jen JEFF            



 capsule,ext  capsule,ext                    



 ended  ended                    



 release  release                    

 

 multivitami  multivitami  2019    Yes  Deeth    Unknown  Unknown      



 n with  n with  -      Jen JEFF            



 minerals  minerals                    



 tablet  tablet                    

 

 Co Q-10 150  Co Q-10 150  2019    Yes  Deeth    Unknown  Unknown      



 mg capsule  mg capsule  1-04      Jen JEFF            

 

 niacin ER  niacin ER  2019    Yes  Deeth    Unknown  Unknown      



 250 mg  250 mg  -04      Jen JEFF            



 tablet,exte  tablet,exte                    



 nded  nded                    



 release  release                    

 

 iron 27mg  iron 27mg  2019    Yes  Deeth    Unknown  Unknown      



     1-04      Jen JEFF            

 

 pravastatin  pravastatin  2019    Yes  Deeth    Unknown  Unknown      



 40 mg  40 mg  -15      Jen JEFF            



 tablet  tablet                    







Vital Signs







 Vital Name  Observation Time  Observation Value  Comments

 

 SYSTOLIC mm[Hg]  2020 18:09:52  146 mm[Hg] mm[Hg]  Method: Sit

 

 SYSTOLIC mm[Hg]  2019 18:08:41  128 mm[Hg] mm[Hg]  Method: Stand

 

 DIASTOLIC mm[Hg]  2020 18:09:52  72 mm[Hg] mm[Hg]  Method: Sit

 

 DIASTOLIC mm[Hg]  2019 18:08:41  70 mm[Hg] mm[Hg]  Method: Stand

 

 PULSE  2020 18:09:52  76 /min /min  

 

 RESP RATE  2020 18:09:52  16 /min /min  

 

 TEMP  2020 18:09:52  98.1 [degF]  







Procedures

This patient has no known procedures.



Results

This patient has no known results.

## 2020-02-18 NOTE — XMS REPORT
Patient Summary Document

 Created on:2020



Patient:PARISH HOUSE Unknown

Sex:Male

:1940

External Reference #:[id number]





Demographics







 Address  [facility] [address]

 

 Home Phone  [phone]

 

 Preferred Language  English

 

 Marital Status  Unknown

 

 Faith Affiliation  Unknown

 

 Race  Unknown

 

 Ethnic Group  Unknown









Author







 Organization  [facility]









Support







 Name  Relationship  Address  Phone

 

 ZI, Unknown Name  NextOfBernardino  Unknown Address  [phone]









Care Team Providers







 Name  Role  Phone

 

 Unavailable  Unavailable  Unavailable









Problems







 Condition  Condition  Condition  Status  Onset  Resolution  Last  Treating  
Comments



 Name  Details  Category    Date  Date  Treatment  Clinician  



             Date    

 

 Hypertensiv  Hypertensiv  Diagnosis  Active        Elmer  



 e heart  e heart            Smith  



 disease  disease            [id number]  



 with heart  with heart              



 failure  failure              

 

 Chronic  Chronic  Diagnosis  Active        Elmer  



 systolic  systolic            [place]  



 (congestive  (congestive            [id number]  



 ) heart  ) heart              



 failure  failure              

 

 Paroxysmal  Paroxysmal  Diagnosis  Active        Elmer  



 atrial  atrial            [place]ider  



 fibrillatio  fibrillatio            [id number]  



 n  n              

 

 Type 2  Type 2  Diagnosis  Active        Elmer  



 diabetes  diabetes            Smith  



 mellitus  mellitus            [id number]  



 without  without              



 complicatio  complicatio              



 ns  ns              

 

 Diverticuli  Diverticuli  Diagnosis  Active        Elmer  



 tis of  tis of            Smith  



 intestine,  intestine,            [id number]  



 part  part              



 unspecified  unspecified              



 , without  , without              



 perforation  perforation              



 or abscess  or abscess              



 without  without              



 bleeding  bleeding              

 

 Anemia,  Anemia,  Diagnosis  Active        Elmer  



 unspecified  unspecified            [place]  



               [id number]  

 

 Benign  Benign  Diagnosis  Active        Elmer  



 prostatic  prostatic            Smith  



 hyperplasia  hyperplasia            [id number]  



 without  without              



 lower  lower              



 urinary  urinary              



 tract  tract              



 symptoms  symptoms              

 

 Lymphocytop  Lymphocytop  Diagnosis  Active        Elmre  



 enia  enia            [place]ider  



               [id number]  

 

 Encounter  Encounter  Diagnosis  Active        Elmer  



 for  alka Maloney  



 attention  attention            [id number]  



 to  to              



 colostomy  colostomy              

 

 Long term  Long term  Diagnosis  Active        Elmer  



 (current)  (current)            [place]ider  



 use of  use of            [id number]  



 anticoagula  anticoagula              



 nts  nts              

 

 Personal  Personal  Diagnosis  Active        Elmer  



 history of  history of            [place]  



 transient  transient            [id number]  



 ischemic  ischemic              



 attack  attack              



 (TIA), and  (TIA), and              



 cerebral  cerebral              



 infarction  infarction              



 without  without              



 residual  residual              



 deficits  deficits              

 

 Presence of  Presence of  Diagnosis  Active        Elmer  



 cardiac  cardiac            [place]  



 pacemaker  pacemaker            [id number]  

 

 Presence of  Presence of  Diagnosis  Active        Elmer  



 prosthetic  prosthetic            Smith  



 heart valve  heart valve            [id number]  

 

 Personal  Personal  Diagnosis  Active        Elmer  



 history of  history of            Smith  



 nicotine  nicotine            [id number]  



 dependence  dependence              

 

 Problems  Problems  Diagnosis  Active        Elmer  



 related to  related to            [place]  



 living  living            [id number]  



 alone  alone              

 

 Pain  frequent  Pain Mgmt  Resolve  2019    Patricia  



   pain    d    10:55:00    Crawford  



         10:30:      [id number]  



         00        

 

 Cardio  edema  Cardiovasc  Active  2019      Patricia  



     ular          Crawford  



         10:30:      [id number]  



         00        

 

 Cardio  knowledge/s  Cardiovasc  Active  2019      Patricia  



   kill  ular          Crawford  



   deficit: pt      10:30:      [id number]  



         00        

 

 Respiratory  dyspnea  Respirator  Resolve  2019    Patricia  



   present  y  d    10:45:00    Crawford  



         10:30:      [id number]  



         00        

 

 Endo/Hema  newly  Endo/Hema  Resolve  2019    Patricia  



   diagnosed    d    09:45:00    Crawford  



   diabetes      10:30:      [id number]  



         00        

 

 Endo/Hema  diabetic  Endo/Hema  Resolve  2019    Patricia  



   foot care    d    09:45:00    Crawford  



         10:30:      [id number]  



         00        

 

 Endo/Hema  anti-coagul  Endo/Hema  Resolve  2019    Patricia  



   ation    d    09:45:00    Crawford  



   therapy      10:30:      [id number]  



         00        

 

 Sensory  impaired  Sensory  Resolve  2019    Patricia  



   hearing    d    10:45:00    Crawford  



         10:30:      [id number]  



         00        

 

 Integument  skin  Integument  Resolve  2019    Patricia  



   integrity    d    11:40:00    Crawford  



   risk      10:30:      [id number]  



         00        

 

 Nutrition  nutritional  Nutrition  Resolve  2019    Patricia  



   restriction    d    11:40:00    C[place]ford  



   s      10:30:      [id number]  



         00        

 

 Elimination  ostomy  Eliminatio  Resolve  2019    Patricia  



   present  n  d    11:40:00    [place]  



         10:30:      [id number]  



         00        

 

 Neuro  confusion  Neuro/Emot  Resolve  2019    Patricia  



   present  ion  d    10:45:00    [place]  



         10:30:      [id number]  



         00        

 

 Neuro  impaired  Neuro/Emot  Resolve  2019    Patricia  



   decision-ma  ion  d    10:45:00    [place]ford  



   felicia      10:30:      [id number]  



         00        

 

 Activity  ADL  Activity  Resolve  2019    Patricia  



   assistance    d    10:45:00    Har[place]  



   required      10:30:      [id number]  



         00        

 

 Activity  self-care  Activity  Resolve  2019    Patricia  



   deficit    d    10:45:00    [place]ford  



         10:30:      [id number]  



         00        

 

 Safety  structural  Safety  Resolve  2019    Patricia  



   barriers    d    10:55:00    [place]ford  



   present      10:30:      [id number]  



         00        

 

 Safety  fall risk  Safety  Resolve  2019    Patricia  



   factor    d    10:55:00    [place]ford  



   present      10:30:      [id number]  



         00        

 

 Safety  risk for  Safety  Resolve  2019    Patricia  



   hospitaliza    d    10:55:00    C[place]ford  



   tion      10:30:      [id number]  



         00        

 

 Safety  can be left  Safety  Active  2019      Patricia  



   alone for            Crawford  



   only short      10:30:      [id number]  



   periods      00        

 

 Safety  safety  Safety  Resolve  2019    QUALITY  



   hazards    d    10:55:00    REALTIME  



   present      10:30:        



         00        

 

 Medication  oral med  Meds  Resolve  2019    Patricia  



   assistance    d    10:55:00    [place]  



   required      10:30:      [id number]  



         00        

 

 Medication  knowledge/s  Meds  Resolve  2019    Patricia  



   kill    d    10:55:00    Crawford  



   deficit: pt      10:30:      [id number]  



         00        

 

 Musculoskel  transfer  Musculoske  Resolve  2019    Patricia  



 etal  assistance  letal  d    10:45:00    [place]ford  



   required      10:30:      [id number]  



         00        

 

 Musculoskel  requires  Musculoske  Resolve  2019    Patricia  



 etal  human  letal  d    10:45:00    Harper  



   assist to      10:30:      [id number]  



   leave home      00        

 

 Cath/Ostomy  k/s  Cath\Ostom  Resolve  2019    Patricia  



 /GI  deficit:  y\GI Care  d    11:40:00    Crawford  



   cath/ost/GI      10:30:      [id number]  



   care - pt      00        

 

 Balance/End  balance/  PT/OT:  Resolve  2019    Shyam  



 urance  rdination  Balance/En  d    10:45:00    Ebonyziewicz  



   deficit  durance    12:07:      [id number]  



         00        

 

 OT: Self  self-care  OT:  Resolve  2019    Shyam  



 Care  deficit  Self-Care  d    10:45:00    Kobziewicz  



         12:07:      [id number]  



         00        

 

 Gait/Locomo  stair  PT/OT:  Resolve  2019    Shyam  



 tion  management  Gait/Locom  d    10:45:00    Kobziewicz  



 problems  req  otion    12:07:      [id number]  



         00        

 

 Gait/Locomo  gait  PT/OT:  Resolve  2019    Shyam  



 tion  deficit  Gait/Locom  d    10:45:00    Kobziewicz  



 problems    otion    12:07:      [id number]  



         00        

 

 Elimination  ostomy  Eliminatio  Resolve  2019    Elmer  



   present  n  d  1-15  10:55:00    [place]ider  



         09:55:      [id number]  



         00        

 

 Elimination  urinary  Eliminatio  Resolve  2019    Elmer  



   incontinenc  n  d    10:45:00    [place] gill      10:45:      [id number]  



         00        

 

 Elimination  ostomy  Eliminatio  Resolve  2019    Elmer  



   present  n  d  -  10:45:00    [place]  



         10:45:      [id number]  



         00        

 

 Safety  risk for  Safety  Active  2019      Elmer  



   hospitaliza            Maloney  



   tion      10:50:      [id number]  



         00        

 

 Activity  ADL  Activity  Unknown  2019      Elmer  



   assistance            Maloney  



   required      10:45:      [id number]  



         00        

 

 Safety  fall risk  Safety  Active  2019      Elmer  



   factor            Maloney  



   present      10:45:      [id number]  



         00        

 

 Sensory  impaired  Sensory  Active        Cherrise  



   hearing            [place]  



         11:30:      [id number]  



         00        

 

 Elimination  urinary  Eliminatio  Resolve  2020    Cherrise  



   incontinenc  n  d    10:55:00    Ramona  



   e      11:30:      [id number]  



         00        

 

 Elimination  urinary  Eliminatio  Resolve  2020    Cherrise  



   urgency  n  d    10:55:00    [place]  



         11:30:      [id number]  



         00        

 

 Elimination  ostomy  Eliminatio  Active        Cherrise  



   present  n          Ramona  



         11:30:      [id number]  



         00        

 

 Neuro  impaired  Neuro/Emot  Active        Cherrise  



   decision-ma  ion          [place]  



   felicia      11:30:      [id number]  



         00        

 

 Safety  structural  Safety  Active        Cherrise  



   barriers            [place]  



   present      11:30:      [id number]  



         00        

 

 Safety  sanitation  Safety  Active        Cherrise  



   hazards            [place]  



   present      11:30:      [id number]  



         00        

 

 Musculoskel  requires  Musculoske  Active        Cherrise  



 etal  human  letal          [place]  



   assist to      11:30:      [id number]  



   leave home      00        







Allergies, Adverse Reactions, Alerts







 Allergy  Allergy  Status  Severity  Reaction(s)  Onset  Inactive  Treating  
Comments



 Name  Type        Date  Date  Clinician  

 

 Unknown  None  Active  Unknown  None      Unknown  No Known



                 Allergies



                 For This



                 Patient







Medications







 Ordered  Filled  Start  Stop  Current  Ordering  Indication  Dosage  Frequency
  Signature  Comments  Components



 Medication  Medication  Date  Date  Medication?  Clinician        (SIG)    



 Name  Name                    

 

 Eliquis 2.5  Eliquis 2.5  2019    No  [place]    Unknown  Unknown      



 mg tablet  mg tablet        Jen JEFF            

 

 metoprolol  metoprolol  2019    No  [place]    Unknown  Unknown      



 tartrate  tartrate        Jen JEFF            



 100 mg  100 mg                    



 tablet  tablet                    

 

 Cardizem CD  Cardizem CD  2019    No  [place]    Unknown  Unknown      



 120 mg  120 mg        Jen JEFF            



 capsule,ext  capsule,ext                    



 ended  ended                    



 release  release                    

 

 multivitami  multivitami  2019    No  [place]    Unknown  Unknown      



 n with  n with        Jen JEFF            



 minerals  minerals                    



 tablet  tablet                    

 

 Co Q-10 150  Co Q-10 150  2019    No  [place]    Unknown  Unknown      



 mg capsule  mg capsule        Jen JEFF            

 

 niacin ER  niacin ER  2019    No  [place]    Unknown  Unknown      



 250 mg  250 mg  -      Jen JEFF            



 tablet,exte  tablet,exte                    



 nded  nded                    



 release  release                    

 

 iron 27mg  iron 27mg  2019    No  [place]    Unknown  Unknown      



     -      Jen JEFF            

 

 pravastatin  pravastatin  2019    No  [place]    Unknown  Unknown      



 40 mg  40 mg  1-15      Jen JEFF            



 tablet  tablet                    







Vital Signs







 Vital Name  Observation Time  Observation Value  Comments

 

 SYSTOLIC mm[Hg]  2020 18:10:20  136 mm[Hg] mm[Hg]  Method: Sit

 

 SYSTOLIC mm[Hg]  2019 18:08:41  128 mm[Hg] mm[Hg]  Method: Stand

 

 DIASTOLIC mm[Hg]  2020 18:10:20  68 mm[Hg] mm[Hg]  Method: Sit

 

 DIASTOLIC mm[Hg]  2019 18:08:41  70 mm[Hg] mm[Hg]  Method: Stand

 

 PULSE  2020 18:10:20  68 /min /min  

 

 RESP RATE  2020 18:10:20  16 /min /min  

 

 TEMP  2020 18:10:20  97.8 [degF]  







Procedures

This patient has no known procedures.



Results

This patient has no known results.

## 2020-02-18 NOTE — XMS REPORT
Patient Summary Document

 Created on:2020



Patient:PARISH HOUSE Unknown

Sex:Male

:1940

External Reference #:[id number]





Demographics







 Address  [facility] [place] [address]

 

 Home Phone  [phone]

 

 Preferred Language  English

 

 Marital Status  Unknown

 

 Anabaptist Affiliation  Unknown

 

 Race  Unknown

 

 Ethnic Group  Unknown









Author







 Organization  [facility]









Support







 Name  Relationship  Address  Phone

 

 ZI, Unknown Name  NextOfBernardino  Unknown Address  [phone]









Care Team Providers







 Name  Role  Phone

 

 Unavailable  Unavailable  Unavailable









Problems







 Condition  Condition  Condition  Status  Onset  Resolution  Last  Treating  
Comments



 Name  Details  Category    Date  Date  Treatment  Clinician  



             Date    

 

 Hypertensiv  Hypertensiv  Diagnosis  Active        Elmer  



 e heart  e heart            Smith  



 disease  disease            [id number]  



 with heart  with heart              



 failure  failure              

 

 Chronic  Chronic  Diagnosis  Active        Elmer  



 systolic  systolic            Haider  



 (congestive  (congestive            [id number]  



 ) heart  ) heart              



 failure  failure              

 

 Paroxysmal  Paroxysmal  Diagnosis  Active        Elmer  



 atrial  atrial            Haider  



 fibrillatio  fibrillatio            [id number]  



 n  n              

 

 Type 2  Type 2  Diagnosis  Active        Elmer  



 diabetes  diabetes            Smith  



 mellitus  mellitus            [id number]  



 without  without              



 complicatio  complicatio              



 ns  ns              

 

 Diverticuli  Diverticuli  Diagnosis  Active        Elmer  



 tis of  tis of            Smith  



 intestine,  intestine,            [id number]  



 part  part              



 unspecified  unspecified              



 , without  , without              



 perforation  perforation              



 or abscess  or abscess              



 without  without              



 bleeding  bleeding              

 

 Anemia,  Anemia,  Diagnosis  Active        Elmer  



 unspecified  unspecified            [place]  



               [id number]  

 

 Benign  Benign  Diagnosis  Active        Elmer  



 prostatic  prostatic            Smith  



 hyperplasia  hyperplasia            [id number]  



 without  without              



 lower  lower              



 urinary  urinary              



 tract  tract              



 symptoms  symptoms              

 

 Lymphocytop  Lymphocytop  Diagnosis  Active        Elmer  



 enia  enia            [place]  



               [id number]  

 

 Encounter  Encounter  Diagnosis  Active        Elmer  



 for  alka Maloney  



 attention  attention            [id number]  



 to  to              



 colostomy  colostomy              

 

 Long term  Long term  Diagnosis  Active        Elmer  



 (current)  (current)            [place]ider  



 use of  use of            [id number]  



 anticoagula  anticoagula              



 nts  nts              

 

 Personal  Personal  Diagnosis  Active        Elmer  



 history of  history of            [place]  



 transient  transient            [id number]  



 ischemic  ischemic              



 attack  attack              



 (TIA), and  (TIA), and              



 cerebral  cerebral              



 infarction  infarction              



 without  without              



 residual  residual              



 deficits  deficits              

 

 Presence of  Presence of  Diagnosis  Active        Elmer  



 cardiac  cardiac            [place]  



 pacemaker  pacemaker            [id number]  

 

 Presence of  Presence of  Diagnosis  Active        Elmer  



 prosthetic  prosthetic            Smith  



 heart valve  heart valve            [id number]  

 

 Personal  Personal  Diagnosis  Active        Elmer  



 history of  history of            Smith  



 nicotine  nicotine            [id number]  



 dependence  dependence              

 

 Problems  Problems  Diagnosis  Active        Elmer  



 related to  related to            [place]  



 living  living            [id number]  



 alone  alone              

 

 Pain  frequent  Pain Mgmt  Resolve  2019    Patricia  



   pain    d    10:55:00    Fletcher  



         10:30:      [id number]  



         00        

 

 Cardio  edema  Cardiovasc  Active  2019      Patricia  



     ular          Fletcher  



         10:30:      [id number]  



         00        

 

 Cardio  knowledge/s  Cardiovasc  Active  2019      Patricia  



   kill  ular          Fletcher  



   deficit: pt      10:30:      [id number]  



         00        

 

 Respiratory  dyspnea  Respirator  Resolve  2019    Patricia  



   present  y  d    10:45:00    Fletcher  



         10:30:      [id number]  



         00        

 

 Endo/Hema  newly  Endo/Hema  Resolve  2019    Patricia  



   diagnosed    d    09:45:00    Fletcher  



   diabetes      10:30:      [id number]  



         00        

 

 Endo/Hema  diabetic  Endo/Hema  Resolve  2019    Patricia  



   foot care    d    09:45:00    Fletcher  



         10:30:      [id number]  



         00        

 

 Endo/Hema  anti-coagul  Endo/Hema  Resolve  2019    Patricia  



   ation    d    09:45:00    Fletcher  



   therapy      10:30:      [id number]  



         00        

 

 Sensory  impaired  Sensory  Resolve  2019    Patricia  



   hearing    d    10:45:00    Fletcher  



         10:30:      [id number]  



         00        

 

 Integument  skin  Integument  Resolve  2019    Patricia  



   integrity    d    11:40:00    Fletcher  



   risk      10:30:      [id number]  



         00        

 

 Nutrition  nutritional  Nutrition  Resolve  2019    Patricia  



   restriction    d    11:40:00    Fletcher  



   s      10:30:      [id number]  



         00        

 

 Elimination  ostomy  Eliminatio  Resolve  2019    Patricia  



   present  n  d    11:40:00    Fletcher  



         10:30:      [id number]  



         00        

 

 Neuro  confusion  Neuro/Emot  Resolve  2019    Patricia  



   present  ion  d    10:45:00    Fletcher  



         10:30:      [id number]  



         00        

 

 Neuro  impaired  Neuro/Emot  Resolve  2019    Patricia  



   decision-ma  ion  d    10:45:00    Fletcher  



   felicia      10:30:      [id number]  



         00        

 

 Activity  ADL  Activity  Resolve  2019    Patricia  



   assistance    d    10:45:00    Harper  



   required      10:30:      [id number]  



         00        

 

 Activity  self-care  Activity  Resolve  2019    Patricia  



   deficit    d    10:45:00    Harper  



         10:30:      [id number]  



         00        

 

 Safety  structural  Safety  Resolve  2019    Patricia  



   barriers    d    10:55:00    Fletcher  



   present      10:30:      [id number]  



         00        

 

 Safety  fall risk  Safety  Resolve  2019    Patricia  



   factor    d    10:55:00    Fletcher  



   present      10:30:      [id number]  



         00        

 

 Safety  risk for  Safety  Resolve  2019    Patricia  



   hospitaliza    d    10:55:00    Fletcher  



   tion      10:30:      [id number]  



         00        

 

 Safety  can be left  Safety  Active  2019      Patricia  



   alone for            Fletcher  



   only short      10:30:      [id number]  



   periods      00        

 

 Safety  safety  Safety  Resolve  2019    QUALITY  



   hazards    d    10:55:00    REALTIME  



   present      10:30:        



         00        

 

 Medication  oral med  Meds  Resolve  2019    Patricia  



   assistance    d    10:55:00    Harper  



   required      10:30:      [id number]  



         00        

 

 Medication  knowledge/s  Meds  Resolve  2019    Patricia  



   kill    d    10:55:00    Fletcher  



   deficit: pt      10:30:      [id number]  



         00        

 

 Musculoskel  transfer  Musculoske  Resolve  2019    Patricia  



 etal  assistance  letal  d    10:45:00    Harper  



   required      10:30:      [id number]  



         00        

 

 Musculoskel  requires  Musculoske  Resolve  2019    Patricia  



 etal  human  letal  d    10:45:00    Harper  



   assist to      10:30:      [id number]  



   leave home      00        

 

 Cath/Ostomy  k/s  Cath\Ostom  Resolve  2019    Patricia  



 /GI  deficit:  y\GI Care  d    11:40:00    Fletcher  



   cath/ost/GI      10:30:      [id number]  



   care - pt      00        

 

 Balance/End  balance/  PT/OT:  Resolve  2019    Syham  



 urance  rdination  Balance/En  d    10:45:00    Ebonyziewicz  



   deficit  durance    12:07:      [id number]  



         00        

 

 OT: Self  self-care  OT:  Resolve  2019    Shyam  



 Care  deficit  Self-Care  d    10:45:00    Kobziewicz  



         12:07:      [id number]  



         00        

 

 Gait/Locomo  stair  PT/OT:  Resolve  2019    Shyam  



 tion  management  Gait/Locom  d    10:45:00    Kobziewicz  



 problems  req  otion    12:07:      [id number]  



         00        

 

 Gait/Locomo  gait  PT/OT:  Resolve  2019    Shyam  



 tion  deficit  Gait/Locom  d    10:45:00    Kobziewicz  



 problems    otion    12:07:      [id number]  



         00        

 

 Elimination  ostomy  Eliminatio  Resolve  2019    Elmer  



   present  n  d  1-15  10:55:00    [place]  



         09:55:      [id number]  



         00        

 

 Elimination  urinary  Eliminatio  Resolve  2019    Elmer  



   incontinenc  n  d    10:45:00    [place] gill      10:45:      [id number]  



         00        

 

 Elimination  ostomy  Eliminatio  Resolve  2019    Elmer  



   present  n  d  -  10:45:00    [place]  



         10:45:      [id number]  



         00        

 

 Safety  risk for  Safety  Active  2019      Elmer  



   hospitaliza            Maloney  



   tion      10:50:      [id number]  



         00        

 

 Activity  ADL  Activity  Unknown  2019      Elmer  



   assistance            Maloney  



   required      10:45:      [id number]  



         00        

 

 Safety  fall risk  Safety  Active  2019      Elmer  



   factor            Maloney  



   present      10:45:      [id number]  



         00        

 

 Sensory  impaired  Sensory  Active        Cherrise  



   hearing            Ramona  



         11:30:      [id number]  



         00        

 

 Elimination  urinary  Eliminatio  Resolve  2020    Cherrise  



   incontinenc  n  d    10:55:00    Meservey  



   e      11:30:      [id number]  



         00        

 

 Elimination  urinary  Eliminatio  Resolve  2020    Cherrise  



   urgency  n  d    10:55:00    Ramona  



         11:30:      [id number]  



         00        

 

 Elimination  ostomy  Eliminatio  Active        Cherrise  



   present  n          Meservey  



         11:30:      [id number]  



         00        

 

 Neuro  impaired  Neuro/Emot  Active        Cherrise  



   decision-ma  ion          Meservey  



   felicia      11:30:      [id number]  



         00        

 

 Safety  structural  Safety  Active        Cherrise  



   barriers            Meservey  



   present      11:30:      [id number]  



         00        

 

 Safety  sanitation  Safety  Active        Cherrise  



   hazards            Meservey  



   present      11:30:      [id number]  



         00        

 

 Musculoskel  requires  Musculoske  Active        Cherrise  



 etal  human  letal          Meservey  



   assist to      11:30:      [id number]  



   leave home      00        







Allergies, Adverse Reactions, Alerts







 Allergy  Allergy  Status  Severity  Reaction(s)  Onset  Inactive  Treating  
Comments



 Name  Type        Date  Date  Clinician  

 

 Unknown  None  Active  Unknown  None      Unknown  No Known



                 Allergies



                 For This



                 Patient







Medications







 Ordered  Filled  Start  Stop  Current  Ordering  Indication  Dosage  Frequency
  Signature  Comments  Components



 Medication  Medication  Date  Date  Medication?  Clinician        (SIG)    



 Name  Name                    

 

 Eliquis 2.5  Eliquis 2.5  2019    No  [place]    Unknown  Unknown      



 mg tablet  mg tablet        Jen JEFF            

 

 metoprolol  metoprolol  2019    No  Fransico    Unknown  Unknown      



 tartrate  tartrate        Jen JEFF            



 100 mg  100 mg                    



 tablet  tablet                    

 

 Cardizem CD  Cardizem CD  2019    No  [place]    Unknown  Unknown      



 120 mg  120 mg        Jen JEFF            



 capsule,ext  capsule,ext                    



 ended  ended                    



 release  release                    

 

 multivitami  multivitami  2019    No  [place]    Unknown  Unknown      



 n with  n with        Jen JEFF            



 minerals  minerals                    



 tablet  tablet                    

 

 Co Q-10 150  Co Q-10 150  2019    No  [place]    Unknown  Unknown      



 mg capsule  mg capsule        Jen JEFF            

 

 niacin ER  niacin ER  2019    No  [place]    Unknown  Unknown      



 250 mg  250 mg  -      Jen JEFF            



 tablet,exte  tablet,exte                    



 nded  nded                    



 release  release                    

 

 iron 27mg  iron 27mg  2019    No  [place]    Unknown  Unknown      



     -      Jen JEFF            

 

 pravastatin  pravastatin  2019    Yes  Fransico    Unknown  Unknown      



 40 mg  40 mg  1-15      Jen JEFF            



 tablet  tablet                    







Vital Signs







 Vital Name  Observation Time  Observation Value  Comments

 

 SYSTOLIC mm[Hg]  2020 18:10:13  132 mm[Hg] mm[Hg]  Method: Sit

 

 SYSTOLIC mm[Hg]  2019 18:08:41  128 mm[Hg] mm[Hg]  Method: Stand

 

 DIASTOLIC mm[Hg]  2020 18:10:13  72 mm[Hg] mm[Hg]  Method: Sit

 

 DIASTOLIC mm[Hg]  2019 18:08:41  70 mm[Hg] mm[Hg]  Method: Stand

 

 PULSE  2020 18:10:13  73 /min /min  

 

 RESP RATE  2020 18:10:13  16 /min /min  

 

 TEMP  2020 18:10:13  97.1 [degF]  







Procedures

This patient has no known procedures.



Results

This patient has no known results.

## 2020-02-18 NOTE — XMS REPORT
Patient Summary Document

 Created on:2020



Patient:PARISH HOUSE Unknown

Sex:Male

:1940

External Reference #:[id number]





Demographics







 Address  [place] [address]

 

 Home Phone  [phone]

 

 Preferred Language  English

 

 Marital Status  Unknown

 

 Bahai Affiliation  Unknown

 

 Race  Unknown

 

 Ethnic Group  Unknown









Author







 Organization  [facility]









Support







 Name  Relationship  Address  Phone

 

 ZI, Unknown Name  NextOfBernardino  Unknown Address  [phone]









Care Team Providers







 Name  Role  Phone

 

 Unavailable  Unavailable  Unavailable









Problems







 Condition  Condition  Condition  Status  Onset  Resolution  Last  Treating  
Comments



 Name  Details  Category    Date  Date  Treatment  Clinician  



             Date    

 

 Hypertensiv  Hypertensiv  Diagnosis  Active        Elmer  



 e heart  e heart            Smith  



 disease  disease            [id number]  



 with heart  with heart              



 failure  failure              

 

 Chronic  Chronic  Diagnosis  Active        Elmer  



 systolic  systolic            [place]  



 (congestive  (congestive            [id number]  



 ) heart  ) heart              



 failure  failure              

 

 Paroxysmal  Paroxysmal  Diagnosis  Active        Elmer  



 atrial  atrial            [place]  



 fibrillatio  fibrillatio            [id number]  



 n  n              

 

 Type 2  Type 2  Diagnosis  Active        Elmer  



 diabetes  diabetes            Smith  



 mellitus  mellitus            [id number]  



 without  without              



 complicatio  complicatio              



 ns  ns              

 

 Diverticuli  Diverticuli  Diagnosis  Active        Elmer  



 tis of  tis of            Smith  



 intestine,  intestine,            [id number]  



 part  part              



 unspecified  unspecified              



 , without  , without              



 perforation  perforation              



 or abscess  or abscess              



 without  without              



 bleeding  bleeding              

 

 Anemia,  Anemia,  Diagnosis  Active        Elmer  



 unspecified  unspecified            [place]  



               [id number]  

 

 Benign  Benign  Diagnosis  Active        Elmer  



 prostatic  prostatic            Smith  



 hyperplasia  hyperplasia            [id number]  



 without  without              



 lower  lower              



 urinary  urinary              



 tract  tract              



 symptoms  symptoms              

 

 Lymphocytop  Lymphocytop  Diagnosis  Active        Elmer  



 enia  enia            [place]  



               [id number]  

 

 Encounter  Encounter  Diagnosis  Active        Elmer  



 for  alka Maloney  



 attention  attention            [id number]  



 to  to              



 colostomy  colostomy              

 

 Long term  Long term  Diagnosis  Active        Elmer  



 (current)  (current)            [place]  



 use of  use of            [id number]  



 anticoagula  anticoagula              



 nts  nts              

 

 Personal  Personal  Diagnosis  Active        Elmer  



 history of  history of            [place]  



 transient  transient            [id number]  



 ischemic  ischemic              



 attack  attack              



 (TIA), and  (TIA), and              



 cerebral  cerebral              



 infarction  infarction              



 without  without              



 residual  residual              



 deficits  deficits              

 

 Presence of  Presence of  Diagnosis  Active        Elmer  



 cardiac  cardiac            [place]  



 pacemaker  pacemaker            [id number]  

 

 Presence of  Presence of  Diagnosis  Active        Elmer  



 prosthetic  prosthetic            Smith  



 heart valve  heart valve            [id number]  

 

 Personal  Personal  Diagnosis  Active        Elmer  



 history of  history of            Smith  



 nicotine  nicotine            [id number]  



 dependence  dependence              

 

 Problems  Problems  Diagnosis  Active        Elmer  



 related to  related to            [place]  



 living  living            [id number]  



 alone  alone              

 

 Pain  frequent  Pain Mgmt  Resolve  2019    Patricia  



   pain    d    10:55:00    [place]  



         10:30:      [id number]  



         00        

 

 Cardio  edema  Cardiovasc  Active  2019      Patricia  



     ular          [place]  



         10:30:      [id number]  



         00        

 

 Cardio  knowledge/s  Cardiovasc  Active  2019      Patricia  



   kill  ular          [place]  



   deficit: pt      10:30:      [id number]  



         00        

 

 Respiratory  dyspnea  Respirator  Resolve  2019    Patricia  



   present  y  d    10:45:00    [place]  



         10:30:      [id number]  



         00        

 

 Endo/Hema  newly  Endo/Hema  Resolve  2019    Patricia  



   diagnosed    d    09:45:00    [place]  



   diabetes      10:30:      [id number]  



         00        

 

 Endo/Hema  diabetic  Endo/Hema  Resolve  2019    Patricia  



   foot care    d    09:45:00    [place]  



         10:30:      [id number]  



         00        

 

 Endo/Hema  anti-coagul  Endo/Hema  Resolve  2019    Patricia  



   ation    d    09:45:00    [place]  



   therapy      10:30:      [id number]  



         00        

 

 Sensory  impaired  Sensory  Resolve  2019    Patricia  



   hearing    d    10:45:00    [place]  



         10:30:      [id number]  



         00        

 

 Integument  skin  Integument  Resolve  2019    Patricia  



   integrity    d    11:40:00    [place]  



   risk      10:30:      [id number]  



         00        

 

 Nutrition  nutritional  Nutrition  Resolve  2019    Patricia  



   restriction    d    11:40:00    [place]  



   s      10:30:      [id number]  



         00        

 

 Elimination  ostomy  Eliminatio  Resolve  2019    Patricia  



   present  n  d    11:40:00    [place]  



         10:30:      [id number]  



         00        

 

 Neuro  confusion  Neuro/Emot  Resolve  2019    Patricia  



   present  ion  d    10:45:00    [place]  



         10:30:      [id number]  



         00        

 

 Neuro  impaired  Neuro/Emot  Resolve  2019    Patricia  



   decision-ma  ion  d    10:45:00    [place]  



   felicia      10:30:      [id number]  



         00        

 

 Activity  ADL  Activity  Resolve  2019    Patricia  



   assistance    d    10:45:00    [place]  



   required      10:30:      [id number]  



         00        

 

 Activity  self-care  Activity  Resolve  2019    Patricia  



   deficit    d    10:45:00    [place]  



         10:30:      [id number]  



         00        

 

 Safety  structural  Safety  Resolve  2019    Patricia  



   barriers    d    10:55:00    [place]  



   present      10:30:      [id number]  



         00        

 

 Safety  fall risk  Safety  Resolve  2019    Patricia  



   factor    d    10:55:00    [place]  



   present      10:30:      [id number]  



         00        

 

 Safety  risk for  Safety  Resolve  2019    Patricia  



   hospitaliza    d    10:55:00    [place]  



   tion      10:30:      [id number]  



         00        

 

 Safety  can be left  Safety  Active  2019      Patricia  



   alone for            [place]  



   only short      10:30:      [id number]  



   periods      00        

 

 Safety  safety  Safety  Resolve  2019    QUALITY  



   hazards    d    10:55:00    REALTIME  



   present      10:30:        



         00        

 

 Medication  oral med  Meds  Resolve  2019    Patricia  



   assistance    d    10:55:00    [place]  



   required      10:30:      [id number]  



         00        

 

 Medication  knowledge/s  Meds  Resolve  2019    Patricia  



   kill    d    10:55:00    [place]  



   deficit: pt      10:30:      [id number]  



         00        

 

 Musculoskel  transfer  Musculoske  Resolve  2019    Patricia  



 etal  assistance  letal  d    10:45:00    [place]  



   required      10:30:      [id number]  



         00        

 

 Musculoskel  requires  Musculoske  Resolve  2019    Patricia  



 etal  human  letal  d    10:45:00    [place]  



   assist to      10:30:      [id number]  



   leave home      00        

 

 Cath/Ostomy  k/s  Cath\Ostom  Resolve  2019    Patricia  



 /GI  deficit:  y\GI Care  d    11:40:00    [place]  



   cath/ost/GI      10:30:      [id number]  



   care - pt      00        

 

 Balance/End  balance/  PT/OT:  Resolve  2019    Shyam  



 urance  rdination  Balance/En  d    10:45:00    Ebonyziewicz  



   deficit  durance    12:07:      [id number]  



         00        

 

 OT: Self  self-care  OT:  Resolve  2019    Shyam  



 Care  deficit  Self-Care  d    10:45:00    Kobziewicz  



         12:07:      [id number]  



         00        

 

 Gait/Locomo  stair  PT/OT:  Resolve  2019    Shyam  



 tion  management  Gait/Locom  d    10:45:00    K[place]ziewicz  



 problems  req  otion    12:07:      [id number]  



         00        

 

 Gait/Locomo  gait  PT/OT:  Resolve  2019    Shyam  



 tion  deficit  Gait/Locom  d    10:45:00    Kobziewicz  



 problems    otion    12:07:      [id number]  



         00        

 

 Elimination  ostomy  Eliminatio  Resolve  2019    Elmer  



   present  n  d  1-15  10:55:00    [place]  



         09:55:      [id number]  



         00        

 

 Elimination  urinary  Eliminatio  Resolve  2019    Elmer  



   incontinenc  n  d    10:45:00    [place] gill      10:45:      [id number]  



         00        

 

 Elimination  ostomy  Eliminatio  Resolve  2019    Elmer  



   present  n  d  -  10:45:00    [place]  



         10:45:      [id number]  



         00        

 

 Safety  risk for  Safety  Active  2019      Elmer  



   hospitaliza            [place]  



   tion      10:50:      [id number]  



         00        

 

 Activity  ADL  Activity  Unknown  2019      Elmer  



   assistance            [place]  



   required      10:45:      [id number]  



         00        

 

 Safety  fall risk  Safety  Active  2019      Elmer  



   factor            [place]  



   present      10:45:      [id number]  



         00        

 

 Sensory  impaired  Sensory  Active        [place]  



   hearing            [place]  



         11:30:      [id number]  



         00        

 

 Elimination  urinary  Eliminatio  Resolve  2020    Cherrise  



   incontinenc  n  d    10:55:00    [place]  



   e      11:30:      [id number]  



         00        

 

 Elimination  urinary  Eliminatio  Resolve  2020    Cherrise  



   urgency  n  d    10:55:00    [place]  



         11:30:      [id number]  



         00        

 

 Elimination  ostomy  Eliminatio  Active        Cherrise  



   present  n          [place]  



         11:30:      [id number]  



         00        

 

 Neuro  impaired  Neuro/Emot  Active        Cherrise  



   decision-ma  ion          [place]  



   felicia      11:30:      [id number]  



         00        

 

 Safety  structural  Safety  Active        Cher[place]  



   barriers            [place]  



   present      11:30:      [id number]  



         00        

 

 Safety  sanitation  Safety  Active        Cher[place]  



   hazards            [place]  



   present      11:30:      [id number]  



         00        

 

 Musculoskel  requires  Musculoske  Active        Cherrise  



 etal  human  letal          [place]  



   assist to      11:30:      [id number]  



   leave home      00        







Allergies, Adverse Reactions, Alerts







 Allergy  Allergy  Status  Severity  Reaction(s)  Onset  Inactive  Treating  
Comments



 Name  Type        Date  Date  Clinician  

 

 Unknown  None  Active  Unknown  None      Unknown  No Known



                 Allergies



                 For This



                 Patient







Medications







 Ordered  Filled  Start  Stop  Current  Ordering  Indication  Dosage  Frequency
  Signature  Comments  Components



 Medication  Medication  Date  Date  Medication?  Clinician        (SIG)    



 Name  Name                    

 

 Eliquis 2.5  Eliquis 2.5  2019    No  Climax    Unknown  Unknown      



 mg tablet  mg tablet        Jen JEFF            

 

 metoprolol  metoprolol  2019    No  Fransico    Unknown  Unknown      



 tartrate  tartrate        Jen JEFF            



 100 mg  100 mg                    



 tablet  tablet                    

 

 Cardizem CD  Cardizem CD  2019    No  Climax    Unknown  Unknown      



 120 mg  120 mg        Jen JEFF            



 capsule,ext  capsule,ext                    



 ended  ended                    



 release  release                    

 

 multivitami  multivitami  2019    No  Climax    Unknown  Unknown      



 n with  n with        Jen JEFF            



 minerals  minerals                    



 tablet  tablet                    

 

 Co Q-10 150  Co Q-10 150  2019    No  Climax    Unknown  Unknown      



 mg capsule  mg capsule        Jen JEFF            

 

 niacin ER  niacin ER  2019    No  Climax    Unknown  Unknown      



 250 mg  250 mg  -      Jen JEFF            



 tablet,exte  tablet,exte                    



 nded  nded                    



 release  release                    

 

 iron 27mg  iron 27mg  2019    No  Climax    Unknown  Unknown      



     -      Jen JEFF            

 

 pravastatin  pravastatin  2019    Yes  Fransico    Unknown  Unknown      



 40 mg  40 mg  1-15      Jen JEFF            



 tablet  tablet                    







Vital Signs







 Vital Name  Observation Time  Observation Value  Comments

 

 SYSTOLIC mm[Hg]  2020 18:10:13  132 mm[Hg] mm[Hg]  Method: Sit

 

 SYSTOLIC mm[Hg]  2019 18:08:41  128 mm[Hg] mm[Hg]  Method: Stand

 

 DIASTOLIC mm[Hg]  2020 18:10:13  72 mm[Hg] mm[Hg]  Method: Sit

 

 DIASTOLIC mm[Hg]  2019 18:08:41  70 mm[Hg] mm[Hg]  Method: Stand

 

 PULSE  2020 18:10:13  73 /min /min  

 

 RESP RATE  2020 18:10:13  16 /min /min  

 

 TEMP  2020 18:10:13  97.1 [degF]  







Procedures

This patient has no known procedures.



Results

This patient has no known results.

## 2020-02-18 NOTE — XMS REPORT
Patient Summary Document

 Created on:2020



Patient:PARISH HOUSE Unknown

Sex:Male

:1940

External Reference #:[id number]





Demographics







 Address  [facility] [place] [address]

 

 Home Phone  [phone]

 

 Preferred Language  English

 

 Marital Status  Unknown

 

 Scient[employer] Affiliation  Unknown

 

 Race  Unknown

 

 Ethnic Group  Unknown









Author







 Organization  [facility]









Support







 Name  Relationship  Address  Phone

 

 ZI, Unknown Name  NextOfBernardino  Unknown Address  [phone]









Care Team Providers







 Name  Role  Phone

 

 Unavailable  Unavailable  Unavailable









Problems







 Condition  Condition  Condition  Status  Onset  Resolution  Last  Treating  
Comments



 Name  Details  Category    Date  Date  Treatment  Clinician  



             Date    

 

 Hypertensiv  Hypertensiv  Diagnosis  Active        Elmer  



 e heart  e heart            Smith  



 disease  disease            [id number]  



 with heart  with heart              



 failure  failure              

 

 Chronic  Chronic  Diagnosis  Active        Elmer  



 systolic  systolic            Haider  



 (congestive  (congestive            [id number]  



 ) heart  ) heart              



 failure  failure              

 

 Paroxysmal  Paroxysmal  Diagnosis  Active        Elmer  



 atrial  atrial            Haider  



 fibrillatio  fibrillatio            [id number]  



 n  n              

 

 Type 2  Type 2  Diagnosis  Active        Elmer  



 diabetes  diabetes            Smith  



 mellitus  mellitus            [id number]  



 without  without              



 complicatio  complicatio              



 ns  ns              

 

 Diverticuli  Diverticuli  Diagnosis  Active        Elmer  



 tis of  tis of            Smith  



 intestine,  intestine,            [id number]  



 part  part              



 unspecified  unspecified              



 , without  , without              



 perforation  perforation              



 or abscess  or abscess              



 without  without              



 bleeding  bleeding              

 

 Anemia,  Anemia,  Diagnosis  Active        Elmer  



 unspecified  unspecified            Haider  



               [id number]  

 

 Benign  Benign  Diagnosis  Active        Elmer  



 prostatic  prostatic            Smith  



 hyperplasia  hyperplasia            [id number]  



 without  without              



 lower  lower              



 urinary  urinary              



 tract  tract              



 symptoms  symptoms              

 

 Lymphocytop  Lymphocytop  Diagnosis  Active        Elmer  



 enia  enia            Haider  



               [id number]  

 

 Encounter  Encounter  Diagnosis  Active        Elmer  



 for  alka Maloney  



 attention  attention            [id number]  



 to  to              



 colostomy  colostomy              

 

 Long term  Long term  Diagnosis  Active        Elmer  



 (current)  (current)            Haider  



 use of  use of            [id number]  



 anticoagula  anticoagula              



 nts  nts              

 

 Personal  Personal  Diagnosis  Active        Elmer  



 history of  history of            Haider  



 transient  transient            [id number]  



 ischemic  ischemic              



 attack  attack              



 (TIA), and  (TIA), and              



 cerebral  cerebral              



 infarction  infarction              



 without  without              



 residual  residual              



 deficits  deficits              

 

 Presence of  Presence of  Diagnosis  Active        Elmer  



 cardiac  cardiac            Haider  



 pacemaker  pacemaker            [id number]  

 

 Presence of  Presence of  Diagnosis  Active        Elmer  



 prosthetic  prosthetic            Smith  



 heart valve  heart valve            [id number]  

 

 Personal  Personal  Diagnosis  Active        Elmer  



 history of  history of            Smith  



 nicotine  nicotine            [id number]  



 dependence  dependence              

 

 Problems  Problems  Diagnosis  Active        Elmer  



 related to  related to            [place]  



 living  living            [id number]  



 alone  alone              

 

 Pain  frequent  Pain Mgmt  Resolve  2019    Patricia  



   pain    d    10:55:00    Mendon  



         10:30:      [id number]  



         00        

 

 Cardio  edema  Cardiovasc  Active  2019      Patricia  



     ular          Mendon  



         10:30:      [id number]  



         00        

 

 Cardio  knowledge/s  Cardiovasc  Active  2019      Patricia  



   kill  ular          Mendon  



   deficit: pt      10:30:      [id number]  



         00        

 

 Respiratory  dyspnea  Respirator  Resolve  2019    Patricia  



   present  y  d    10:45:00    Mendon  



         10:30:      [id number]  



         00        

 

 Endo/Hema  newly  Endo/Hema  Resolve  2019    Patricia  



   diagnosed    d    09:45:00    Mendon  



   diabetes      10:30:      [id number]  



         00        

 

 Endo/Hema  diabetic  Endo/Hema  Resolve  2019    Patricia  



   foot care    d    09:45:00    Mendon  



         10:30:      [id number]  



         00        

 

 Endo/Hema  anti-coagul  Endo/Hema  Resolve  2019    Patricia  



   ation    d    09:45:00    Mendon  



   therapy      10:30:      [id number]  



         00        

 

 Sensory  impaired  Sensory  Resolve  2019    Patricia  



   hearing    d    10:45:00    Mendon  



         10:30:      [id number]  



         00        

 

 Integument  skin  Integument  Resolve  2019    Patricia  



   integrity    d    11:40:00    Mendon  



   risk      10:30:      [id number]  



         00        

 

 Nutrition  nutritional  Nutrition  Resolve  2019    Patricia  



   restriction    d    11:40:00    Mendon  



   s      10:30:      [id number]  



         00        

 

 Elimination  ostomy  Eliminatio  Resolve  2019    Patricia  



   present  n  d    11:40:00    Mendon  



         10:30:      [id number]  



         00        

 

 Neuro  confusion  Neuro/Emot  Resolve  2019    Patricia  



   present  ion  d    10:45:00    Mendon  



         10:30:      [id number]  



         00        

 

 Neuro  impaired  Neuro/Emot  Resolve  2019    Patricia  



   decision-ma  ion  d    10:45:00    Mendon  



   felicia      10:30:      [id number]  



         00        

 

 Activity  ADL  Activity  Resolve  2019    Patricia  



   assistance    d    10:45:00    Harper  



   required      10:30:      [id number]  



         00        

 

 Activity  self-care  Activity  Resolve  2019    Patricia  



   deficit    d    10:45:00    Mendon  



         10:30:      [id number]  



         00        

 

 Safety  structural  Safety  Resolve  2019    Patricia  



   barriers    d    10:55:00    Mendon  



   present      10:30:      [id number]  



         00        

 

 Safety  fall risk  Safety  Resolve  2019    Patricia  



   factor    d    10:55:00    Mendon  



   present      10:30:      [id number]  



         00        

 

 Safety  risk for  Safety  Resolve  2019    Patricia  



   hospitaliza    d    10:55:00    Mendon  



   tion      10:30:      [id number]  



         00        

 

 Safety  can be left  Safety  Active  2019      Patricia  



   alone for            Mendon  



   only short      10:30:      [id number]  



   periods      00        

 

 Safety  safety  Safety  Resolve  2019    QUALITY  



   hazards    d    10:55:00    REALTIME  



   present      10:30:        



         00        

 

 Medication  oral med  Meds  Resolve  2019    Patricia  



   assistance    d    10:55:00    Mendon  



   required      10:30:      [id number]  



         00        

 

 Medication  knowledge/s  Meds  Resolve  2019    Patricia  



   kill    d    10:55:00    Mendon  



   deficit: pt      10:30:      [id number]  



         00        

 

 Musculoskel  transfer  Musculoske  Resolve  2019    Patricia  



 etal  assistance  letal  d    10:45:00    Mendon  



   required      10:30:      [id number]  



         00        

 

 Musculoskel  requires  Musculoske  Resolve  2019    Patricia  



 etal  human  letal  d    10:45:00    Harper  



   assist to      10:30:      [id number]  



   leave home      00        

 

 Cath/Ostomy  k/s  Cath\Ostom  Resolve  2019    Patricia  



 /GI  deficit:  y\GI Care  d    11:40:00    Mendon  



   cath/ost/GI      10:30:      [id number]  



   care - pt      00        

 

 Balance/End  balance/  PT/OT:  Resolve  2019    Shyam kimance  rdination  Balance/En  d    10:45:00    Kobziewicz  



   deficit  durance    12:07:      [id number]  



         00        

 

 OT: Self  self-care  OT:  Resolve  2019    Shyam  



 Care  deficit  Self-Care  d    10:45:00    Kobziewicz  



         12:07:      [id number]  



         00        

 

 Gait/Locomo  stair  PT/OT:  Resolve  2019    Shyam  



 tion  management  Gait/Locom  d    10:45:00    Kobziewicz  



 problems  req  otion    12:07:      [id number]  



         00        

 

 Gait/Locomo  gait  PT/OT:  Resolve  2019    Shyam  



 tion  deficit  Gait/Locom  d    10:45:00    Kobziewicz  



 problems    otion    12:07:      [id number]  



         00        

 

 Elimination  ostomy  Eliminatio  Resolve  2019    Elmer  



   present  n  d  1-15  10:55:00    Haider  



         09:55:      [id number]  



         00        

 

 Elimination  urinary  Eliminatio  Resolve  2019    Elmer  



   incontinenc  n  d  2-11  10:45:00    Haider gill      10:45:      [id number]  



         00        

 

 Elimination  ostomy  Eliminatio  Resolve  2019    Elmer  



   present  n  d  2-11  10:45:00    Haider  



         10:45:      [id number]  



         00        

 

 Safety  risk for  Safety  Active  2019      Elmer  



   hospitaliza            [place]ider  



   tion      10:50:      [id number]  



         00        

 

 Activity  ADL  Activity  Unknown  2019      Elmer  



   assistance            Maloney  



   required      10:45:      [id number]  



         00        

 

 Safety  fall risk  Safety  Active  2019      Elmer  



   factor            Maloney  



   present      10:45:      [id number]  



         00        







Allergies, Adverse Reactions, Alerts







 Allergy  Allergy  Status  Severity  Reaction(s)  Onset  Inactive  Treating  
Comments



 Name  Type        Date  Date  Clinician  

 

 Unknown  None  Active  Unknown  None      Unknown  No Known



                 Allergies



                 For This



                 Patient







Medications







 Ordered  Filled  Start  Stop  Current  Ordering  Indication  Dosage  Frequency
  Signature  Comments  Components



 Medication  Medication  Date  Date  Medication?  Clinician        (SIG)    



 Name  Name                    

 

 Eliquis 2.5  Eliquis 2.5  2019    Yes  Barwick    Unknown  Unknown      



 mg tablet  mg tablet        Jen JEFF            

 

 metoprolol  metoprolol  2019    Yes  Barwick    Unknown  Unknown      



 tartrate  tartrate  1-04      Jen JEFF            



 100 mg  100 mg                    



 tablet  tablet                    

 

 Cardizem CD  Cardizem CD  2019    Yes  Barwick    Unknown  Unknown      



 120 mg  120 mg  1-04      Jen JEFF            



 capsule,ext  capsule,ext                    



 ended  ended                    



 release  release                    

 

 multivitami  multivitami  2019    Yes  Barwick    Unknown  Unknown      



 n with  n with  -      Jen JEFF            



 minerals  minerals                    



 tablet  tablet                    

 

 Co Q-10 150  Co Q-10 150  2019    Yes  Barwick    Unknown  Unknown      



 mg capsule  mg capsule  -04      Jen JEFF            

 

 niacin ER  niacin ER  2019    Yes  Barwick    Unknown  Unknown      



 250 mg  250 mg  -04      Jen JEFF            



 tablet,exte  tablet,exte                    



 nded  nded                    



 release  release                    

 

 iron 27mg  iron 27mg  2019    Yes  Barwick    Unknown  Unknown      



     1-04      Jen JEFF            

 

 pravastatin  pravastatin  2019    Yes  Barwick    Unknown  Unknown      



 40 mg  40 mg  -15      Jen JEFF            



 tablet  tablet                    







Vital Signs







 Vital Name  Observation Time  Observation Value  Comments

 

 SYSTOLIC mm[Hg]  2020 18:09:45  112 mm[Hg] mm[Hg]  Method: Sit

 

 SYSTOLIC mm[Hg]  2019 18:08:41  128 mm[Hg] mm[Hg]  Method: Stand

 

 DIASTOLIC mm[Hg]  2020 18:09:45  68 mm[Hg] mm[Hg]  Method: Sit

 

 DIASTOLIC mm[Hg]  2019 18:08:41  70 mm[Hg] mm[Hg]  Method: Stand

 

 PULSE  2020 18:09:45  72 /min /min  

 

 RESP RATE  2020 18:09:45  16 /min /min  

 

 TEMP  2020 18:09:45  98.1 [degF]  







Procedures

This patient has no known procedures.



Results

This patient has no known results.

## 2020-02-18 NOTE — XMS REPORT
Patient Summary Document

 Created on:2020



Patient:PARISH HOUSE Unknown

Sex:Male

:1940

External Reference #:[id number]





Demographics







 Address  [facility] [place] [address]

 

 Home Phone  [phone]

 

 Preferred Language  English

 

 Marital Status  Unknown

 

 [employer] Affiliation  Unknown

 

 Race  Unknown

 

 Ethnic Group  Unknown









Author







 Organization  [facility]









Support







 Name  Relationship  Address  Phone

 

 ZI, Unknown Name  NextOfBernardino  Unknown Address  [phone]









Care Team Providers







 Name  Role  Phone

 

 Unavailable  Unavailable  Unavailable









Problems







 Condition  Condition  Condition  Status  Onset  Resolution  Last  Treating  
Comments



 Name  Details  Category    Date  Date  Treatment  Clinician  



             Date    

 

 Hypertensiv  Hypertensiv  Diagnosis  Active        Elmer  



 e heart  e heart            Smith  



 disease  disease            [id number]  



 with heart  with heart              



 failure  failure              

 

 Chronic  Chronic  Diagnosis  Active        Elmer  



 systolic  systolic            Haider  



 (congestive  (congestive            [id number]  



 ) heart  ) heart              



 failure  failure              

 

 Paroxysmal  Paroxysmal  Diagnosis  Active        Elmer  



 atrial  atrial            Hiader  



 fibrillatio  fibrillatio            [id number]  



 n  n              

 

 Type 2  Type 2  Diagnosis  Active        Elmer  



 diabetes  diabetes            Smith  



 mellitus  mellitus            [id number]  



 without  without              



 complicatio  complicatio              



 ns  ns              

 

 Diverticuli  Diverticuli  Diagnosis  Active        Elmer  



 tis of  tis of            Smith  



 intestine,  intestine,            [id number]  



 part  part              



 unspecified  unspecified              



 , without  , without              



 perforation  perforation              



 or abscess  or abscess              



 without  without              



 bleeding  bleeding              

 

 Anemia,  Anemia,  Diagnosis  Active        Elmer  



 unspecified  unspecified            [place]  



               [id number]  

 

 Benign  Benign  Diagnosis  Active        Elmer  



 prostatic  prostatic            Smith  



 hyperplasia  hyperplasia            [id number]  



 without  without              



 lower  lower              



 urinary  urinary              



 tract  tract              



 symptoms  symptoms              

 

 Lymphocytop  Lymphocytop  Diagnosis  Active        Elmer  



 enia  enia            [place]  



               [id number]  

 

 Encounter  Encounter  Diagnosis  Active        Elmer  



 for  alak Maloney  



 attention  attention            [id number]  



 to  to              



 colostomy  colostomy              

 

 Long term  Long term  Diagnosis  Active        Elmer  



 (current)  (current)            [place]ider  



 use of  use of            [id number]  



 anticoagula  anticoagula              



 nts  nts              

 

 Personal  Personal  Diagnosis  Active        Elmer  



 history of  history of            [place]  



 transient  transient            [id number]  



 ischemic  ischemic              



 attack  attack              



 (TIA), and  (TIA), and              



 cerebral  cerebral              



 infarction  infarction              



 without  without              



 residual  residual              



 deficits  deficits              

 

 Presence of  Presence of  Diagnosis  Active        Elmer  



 cardiac  cardiac            [place]  



 pacemaker  pacemaker            [id number]  

 

 Presence of  Presence of  Diagnosis  Active        Elmer  



 prosthetic  prosthetic            Smith  



 heart valve  heart valve            [id number]  

 

 Personal  Personal  Diagnosis  Active        Elmer  



 history of  history of            Smith  



 nicotine  nicotine            [id number]  



 dependence  dependence              

 

 Problems  Problems  Diagnosis  Active        Elmer  



 related to  related to            [place]  



 living  living            [id number]  



 alone  alone              

 

 Pain  frequent  Pain Mgmt  Resolve  2019    Patricia  



   pain    d    10:55:00    [place]kinson  



         10:30:      [id number]  



         00        

 

 Cardio  edema  Cardiovasc  Active  2019      Patricia  



     ular          Atkinson  



         10:30:      [id number]  



         00        

 

 Cardio  knowledge/s  Cardiovasc  Active  2019      Patricia  



   kill  ular          [place]kinson  



   deficit: pt      10:30:      [id number]  



         00        

 

 Respiratory  dyspnea  Respirator  Resolve  2019    Patricia  



   present  y  d    10:45:00    [place]  



         10:30:      [id number]  



         00        

 

 Endo/Hema  newly  Endo/Hema  Resolve  2019    Patricia  



   diagnosed    d    09:45:00    Atkinson  



   diabetes      10:30:      [id number]  



         00        

 

 Endo/Hema  diabetic  Endo/Hema  Resolve  2019    Patricia  



   foot care    d    09:45:00    [place]  



         10:30:      [id number]  



         00        

 

 Endo/Hema  anti-coagul  Endo/Hema  Resolve  2019    Patricia  



   ation    d    09:45:00    Atkinson  



   therapy      10:30:      [id number]  



         00        

 

 Sensory  impaired  Sensory  Resolve  2019    Patricia  



   hearing    d    10:45:00    [place]  



         10:30:      [id number]  



         00        

 

 Integument  skin  Integument  Resolve  2019    Patircia  



   integrity    d    11:40:00    [place]  



   risk      10:30:      [id number]  



         00        

 

 Nutrition  nutritional  Nutrition  Resolve  2019    Patricia  



   restriction    d    11:40:00    [place]  



   s      10:30:      [id number]  



         00        

 

 Elimination  ostomy  Eliminatio  Resolve  2019    Patricia  



   present  n  d    11:40:00    [place]  



         10:30:      [id number]  



         00        

 

 Neuro  confusion  Neuro/Emot  Resolve  2019    Patricia  



   present  ion  d    10:45:00    [place]  



         10:30:      [id number]  



         00        

 

 Neuro  impaired  Neuro/Emot  Resolve  2019    Patricia  



   decision-ma  ion  d    10:45:00    [place]  



   felicia      10:30:      [id number]  



         00        

 

 Activity  ADL  Activity  Resolve  2019    Patricia  



   assistance    d    10:45:00    [place]  



   required      10:30:      [id number]  



         00        

 

 Activity  self-care  Activity  Resolve  2019    Patricia  



   deficit    d    10:45:00    [place]  



         10:30:      [id number]  



         00        

 

 Safety  structural  Safety  Resolve  2019    Patricia  



   barriers    d    10:55:00    [place]  



   present      10:30:      [id number]  



         00        

 

 Safety  fall risk  Safety  Resolve  2019    Patricia  



   factor    d    10:55:00    [place]  



   present      10:30:      [id number]  



         00        

 

 Safety  risk for  Safety  Resolve  2019    Patricia  



   hospitaliza    d    10:55:00    [place]  



   tion      10:30:      [id number]  



         00        

 

 Safety  can be left  Safety  Active  2019      Patricia  



   alone for            [place]kinson  



   only short      10:30:      [id number]  



   periods      00        

 

 Safety  safety  Safety  Resolve  2019    QUALITY  



   hazards    d    10:55:00    REALTIME  



   present      10:30:        



         00        

 

 Medication  oral med  Meds  Resolve  2019    Patricia  



   assistance    d    10:55:00    [place]  



   required      10:30:      [id number]  



         00        

 

 Medication  knowledge/s  Meds  Resolve  2019    Patricia  



   kill    d    10:55:00    [place]  



   deficit: pt      10:30:      [id number]  



         00        

 

 Musculoskel  transfer  Musculoske  Resolve  2019    Patricia  



 etal  assistance  letal  d    10:45:00    [place]  



   required      10:30:      [id number]  



         00        

 

 Musculoskel  requires  Musculoske  Resolve  2019    Patricia  



 etal  human  letal  d    10:45:00    [place]  



   assist to      10:30:      [id number]  



   leave home      00        

 

 Cath/Ostomy  k/s  Cath\Ostom  Resolve  2019    Patricia  



 /GI  deficit:  y\GI Care  d    11:40:00    [place]kinson  



   cath/ost/GI      10:30:      [id number]  



   care - pt      00        

 

 Balance/End  balance/  PT/OT:  Resolve  2019    Shyam kimance  rdination  Balance/En  d    10:45:00    Kobziewicz  



   deficit  durance    12:07:      [id number]  



         00        

 

 OT: Self  self-care  OT:  Resolve  2019    Shyam  



 Care  deficit  Self-Care  d    10:45:00    Kobziewicz  



         12:07:      [id number]  



         00        

 

 Gait/Locomo  stair  PT/OT:  Resolve  2019    Shyam  



 tion  management  Gait/Locom  d    10:45:00    Kobziewicz  



 problems  req  otion    12:07:      [id number]  



         00        

 

 Gait/Locomo  gait  PT/OT:  Resolve  2019    Shyam  



 tion  deficit  Gait/Locom  d    10:45:00    Kobziewicz  



 problems    otion    12:07:      [id number]  



         00        

 

 Elimination  ostomy  Eliminatio  Resolve  2019    Elmer  



   present  n  d  1-15  10:55:00    [place]  



         09:55:      [id number]  



         00        

 

 Elimination  urinary  Eliminatio  Resolve  2019    Elmer  



   incontinenc  n  d  2-11  10:45:00    [place] gill      10:45:      [id number]  



         00        

 

 Elimination  ostomy  Eliminatio  Resolve  2019    Elmer  



   present  n  d  2-11  10:45:00    [place]  



         10:45:      [id number]  



         00        

 

 Safety  risk for  Safety  Active  2019      Elmer  



   hospitaliza            [place]  



   tion      10:50:      [id number]  



         00        

 

 Activity  ADL  Activity  Unknown  2019      Elmer  



   assistance            Maloney  



   required      10:45:      [id number]  



         00        

 

 Safety  fall risk  Safety  Active  2019      Elmer  



   factor            Maloney  



   present      10:45:      [id number]  



         00        







Allergies, Adverse Reactions, Alerts







 Allergy  Allergy  Status  Severity  Reaction(s)  Onset  Inactive  Treating  
Comments



 Name  Type        Date  Date  Clinician  

 

 Unknown  None  Active  Unknown  None      Unknown  No Known



                 Allergies



                 For This



                 Patient







Medications







 Ordered  Filled  Start  Stop  Current  Ordering  Indication  Dosage  Frequency
  Signature  Comments  Components



 Medication  Medication  Date  Date  Medication?  Clinician        (SIG)    



 Name  Name                    

 

 Eliquis 2.5  Eliquis 2.5  2019    Yes  [place]    Unknown  Unknown      



 mg tablet  mg tablet        Jen JEFF            

 

 metoprolol  metoprolol  2019    Yes  [place]    Unknown  Unknown      



 tartrate  tartrate  1-04      Jen JEFF            



 100 mg  100 mg                    



 tablet  tablet                    

 

 Cardizem CD  Cardizem CD  2019    Yes  [place]    Unknown  Unknown      



 120 mg  120 mg  1-04      Jen JEFF            



 capsule,ext  capsule,ext                    



 ended  ended                    



 release  release                    

 

 multivitami  multivitami  2019    Yes  [place]    Unknown  Unknown      



 n with  n with  -      Jen JEFF            



 minerals  minerals                    



 tablet  tablet                    

 

 Co Q-10 150  Co Q-10 150  2019    Yes  [place]    Unknown  Unknown      



 mg capsule  mg capsule  -04      Jen JEFF            

 

 niacin ER  niacin ER  2019    Yes  [place]    Unknown  Unknown      



 250 mg  250 mg  -04      Jen JEFF            



 tablet,exte  tablet,exte                    



 nded  nded                    



 release  release                    

 

 iron 27mg  iron 27mg  2019    Yes  [place]    Unknown  Unknown      



     1-04      Jen JEFF            

 

 pravastatin  pravastatin  2019    Yes  [place]    Unknown  Unknown      



 40 mg  40 mg  -15      Jen JEFF            



 tablet  tablet                    







Vital Signs







 Vital Name  Observation Time  Observation Value  Comments

 

 SYSTOLIC mm[Hg]  2020 18:09:45  112 mm[Hg] mm[Hg]  Method: Sit

 

 SYSTOLIC mm[Hg]  2019 18:08:41  128 mm[Hg] mm[Hg]  Method: Stand

 

 DIASTOLIC mm[Hg]  2020 18:09:45  68 mm[Hg] mm[Hg]  Method: Sit

 

 DIASTOLIC mm[Hg]  2019 18:08:41  70 mm[Hg] mm[Hg]  Method: Stand

 

 PULSE  2020 18:09:45  72 /min /min  

 

 RESP RATE  2020 18:09:45  16 /min /min  

 

 TEMP  2020 18:09:45  98.1 [degF]  







Procedures

This patient has no known procedures.



Results

This patient has no known results.

## 2020-02-18 NOTE — XMS REPORT
Patient Summary Document

 Created on:2020



Patient:PAR[facility] HOUSE Unknown

Sex:Male

:1940

External Reference #:[id number]





Demographics







 Address  [place] [address]

 

 Home Phone  [phone]

 

 Preferred Language  English

 

 Marital Status  Unknown

 

 Yazidi Affiliation  Unknown

 

 Race  Unknown

 

 Ethnic Group  Unknown









Author







 Organization  [facility]









Support







 Name  Relationship  Address  Phone

 

 ZI, Unknown Name  NextOfBernardino  Unknown Address  [phone]









Care Team Providers







 Name  Role  Phone

 

 Unavailable  Unavailable  Unavailable









Problems







 Condition  Condition  Condition  Status  Onset  Resolution  Last  Treating  
Comments



 Name  Details  Category    Date  Date  Treatment  Clinician  



             Date    

 

 Hypertensiv  Hypertensiv  Diagnosis  Active        Elmer  



 e heart  e heart            Smith  



 disease  disease            [id number]  



 with heart  with heart              



 failure  failure              

 

 Chronic  Chronic  Diagnosis  Active        Elmer  



 systolic  systolic            [place]  



 (congestive  (congestive            [id number]  



 ) heart  ) heart              



 failure  failure              

 

 Paroxysmal  Paroxysmal  Diagnosis  Active        Elmer  



 atrial  atrial            [place]  



 fibrillatio  fibrillatio            [id number]  



 n  n              

 

 Type 2  Type 2  Diagnosis  Active        Elmer  



 diabetes  diabetes            Smith  



 mellitus  mellitus            [id number]  



 without  without              



 complicatio  complicatio              



 ns  ns              

 

 Diverticuli  Diverticuli  Diagnosis  Active        Elmer  



 tis of  tis of            Smith  



 intestine,  intestine,            [id number]  



 part  part              



 unspecified  unspecified              



 , without  , without              



 perforation  perforation              



 or abscess  or abscess              



 without  without              



 bleeding  bleeding              

 

 Anemia,  Anemia,  Diagnosis  Active        Elmer  



 unspecified  unspecified            [place]  



               [id number]  

 

 Benign  Benign  Diagnosis  Active        Elmer  



 prostatic  prostatic            Smith  



 hyperplasia  hyperplasia            [id number]  



 without  without              



 lower  lower              



 urinary  urinary              



 tract  tract              



 symptoms  symptoms              

 

 Lymphocytop  Lymphocytop  Diagnosis  Active        Elmer  



 enia  enia            [place]  



               [id number]  

 

 Encounter  Encounter  Diagnosis  Active        Elmer  



 for  alka Maloney  



 attention  attention            [id number]  



 to  to              



 colostomy  colostomy              

 

 Long term  Long term  Diagnosis  Active        Elmer  



 (current)  (current)            [place]  



 use of  use of            [id number]  



 anticoagula  anticoagula              



 nts  nts              

 

 Personal  Personal  Diagnosis  Active        Elmer  



 history of  history of            [place]  



 transient  transient            [id number]  



 ischemic  ischemic              



 attack  attack              



 (TIA), and  (TIA), and              



 cerebral  cerebral              



 infarction  infarction              



 without  without              



 residual  residual              



 deficits  deficits              

 

 Presence of  Presence of  Diagnosis  Active        Elmer  



 cardiac  cardiac            [place]  



 pacemaker  pacemaker            [id number]  

 

 Presence of  Presence of  Diagnosis  Active        Elmer  



 prosthetic  prosthetic            Smith  



 heart valve  heart valve            [id number]  

 

 Personal  Personal  Diagnosis  Active        Elmer  



 history of  history of            Smith  



 nicotine  nicotine            [id number]  



 dependence  dependence              

 

 Problems  Problems  Diagnosis  Active        Elmer  



 related to  related to            [place]  



 living  living            [id number]  



 alone  alone              

 

 Pain  frequent  Pain Mgmt  Resolve  2019    Patricia  



   pain    d    10:55:00    [place]  



         10:30:      [id number]  



         00        

 

 Cardio  edema  Cardiovasc  Active  2019      Patricia  



     ular          [place]  



         10:30:      [id number]  



         00        

 

 Cardio  knowledge/s  Cardiovasc  Active  2019      Patricia  



   kill  ular          [place]  



   deficit: pt      10:30:      [id number]  



         00        

 

 Respiratory  dyspnea  Respirator  Resolve  2019    Patricia  



   present  y  d    10:45:00    [place]  



         10:30:      [id number]  



         00        

 

 Endo/Hema  newly  Endo/Hema  Resolve  2019    Patricia  



   diagnosed    d    09:45:00    [place]  



   diabetes      10:30:      [id number]  



         00        

 

 Endo/Hema  diabetic  Endo/Hema  Resolve  2019    Patricia  



   foot care    d    09:45:00    [place]  



         10:30:      [id number]  



         00        

 

 Endo/Hema  anti-coagul  Endo/Hema  Resolve  2019    Patricia  



   ation    d    09:45:00    [place]  



   therapy      10:30:      [id number]  



         00        

 

 Sensory  impaired  Sensory  Resolve  2019    Patricia  



   hearing    d    10:45:00    [place]  



         10:30:      [id number]  



         00        

 

 Integument  skin  Integument  Resolve  2019    Particia  



   integrity    d    11:40:00    [place]  



   risk      10:30:      [id number]  



         00        

 

 Nutrition  nutritional  Nutrition  Resolve  2019    Patricia  



   restriction    d    11:40:00    [place]  



   s      10:30:      [id number]  



         00        

 

 Elimination  ostomy  Eliminatio  Resolve  2019    Patricia  



   present  n  d    11:40:00    [place]  



         10:30:      [id number]  



         00        

 

 Neuro  confusion  Neuro/Emot  Resolve  2019    Patricia  



   present  ion  d    10:45:00    [place]  



         10:30:      [id number]  



         00        

 

 Neuro  impaired  Neuro/Emot  Resolve  2019    Patricia  



   decision-ma  ion  d    10:45:00    [place]  



   felicia      10:30:      [id number]  



         00        

 

 Activity  ADL  Activity  Resolve  2019    Patricia  



   assistance    d    10:45:00    [place]  



   required      10:30:      [id number]  



         00        

 

 Activity  self-care  Activity  Resolve  2019    Patricia  



   deficit    d    10:45:00    [place]  



         10:30:      [id number]  



         00        

 

 Safety  structural  Safety  Resolve  2019    Patricia  



   barriers    d    10:55:00    [place]  



   present      10:30:      [id number]  



         00        

 

 Safety  fall risk  Safety  Resolve  2019    Patricia  



   factor    d    10:55:00    [place]  



   present      10:30:      [id number]  



         00        

 

 Safety  risk for  Safety  Resolve  2019    Patricia  



   hospitaliza    d    10:55:00    [place]  



   tion      10:30:      [id number]  



         00        

 

 Safety  can be left  Safety  Active  2019      Patricia  



   alone for            [place]  



   only short      10:30:      [id number]  



   periods      00        

 

 Safety  safety  Safety  Resolve  2019    QUALITY  



   hazards    d    10:55:00    REALTIME  



   present      10:30:        



         00        

 

 Medication  oral med  Meds  Resolve  2019    Patricia  



   assistance    d    10:55:00    [place]  



   required      10:30:      [id number]  



         00        

 

 Medication  knowledge/s  Meds  Resolve  2019    Patricia  



   kill    d    10:55:00    [place]  



   deficit: pt      10:30:      [id number]  



         00        

 

 Musculoskel  transfer  Musculoske  Resolve  2019    Patricia  



 etal  assistance  letal  d    10:45:00    [place]  



   required      10:30:      [id number]  



         00        

 

 Musculoskel  requires  Musculoske  Resolve  2019    Patricia  



 etal  human  letal  d    10:45:00    [place]  



   assist to      10:30:      [id number]  



   leave home      00        

 

 Cath/Ostomy  k/s  Cath\Ostom  Resolve  2019    Patricia  



 /GI  deficit:  y\GI Care  d    11:40:00    [place]  



   cath/ost/GI      10:30:      [id number]  



   care - pt      00        

 

 Balance/End  balance/  PT/OT:  Resolve  2019    Shyam kimance  rdination  Balance/En  d    10:45:00    [place]  



   deficit  durance    12:07:      [id number]  



         00        

 

 OT: Self  self-care  OT:  Resolve  2019    Shyam  



 Care  deficit  Self-Care  d    10:45:00    [place]  



         12:07:      [id number]  



         00        

 

 Gait/Locomo  stair  PT/OT:  Resolve  2019    Shyam  



 tion  management  Gait/Locom  d    10:45:00    Kobziewicz  



 problems  req  otion    12:07:      [id number]  



         00        

 

 Gait/Locomo  gait  PT/OT:  Resolve  2019    Shyam  



 tion  deficit  Gait/Locom  d    10:45:00    Kobziewicz  



 problems    otion    12:07:      [id number]  



         00        

 

 Elimination  ostomy  Eliminatio  Resolve  2019    Elmer  



   present  n  d  1-15  10:55:00    [place]  



         09:55:      [id number]  



         00        

 

 Elimination  urinary  Eliminatio  Resolve  2019    Elmer  



   incontinenc  n  d  2-11  10:45:00    [place] gill      10:45:      [id number]  



         00        

 

 Elimination  ostomy  Eliminatio  Resolve  2019    Elmer  



   present  n  d  2-11  10:45:00    [place]  



         10:45:      [id number]  



         00        

 

 Safety  risk for  Safety  Active  2019      Elmer  



   hospitaliza            [place]  



   tion      10:50:      [id number]  



         00        

 

 Activity  ADL  Activity  Unknown  2019      Elmer  



   assistance            [place]  



   required      10:45:      [id number]  



         00        

 

 Safety  fall risk  Safety  Active  2019      Elmer  



   factor            [place]oney  



   present      10:45:      [id number]  



         00        







Allergies, Adverse Reactions, Alerts







 Allergy  Allergy  Status  Severity  Reaction(s)  Onset  Inactive  Treating  
Comments



 Name  Type        Date  Date  Clinician  

 

 Unknown  None  Active  Unknown  None      Unknown  No Known



                 Allergies



                 For This



                 Patient







Medications







 Ordered  Filled  Start  Stop  Current  Ordering  Indication  Dosage  Frequency
  Signature  Comments  Components



 Medication  Medication  Date  Date  Medication?  Clinician        (SIG)    



 Name  Name                    

 

 Eliquis 2.5  Eliquis 2.5  2019    Yes  [place]    Unknown  Unknown      



 mg tablet  mg tablet        Jen JEFF            

 

 metoprolol  metoprolol  2019    Yes  [place]    Unknown  Unknown      



 tartrate  tartrate  1-04      Jen JEFF            



 100 mg  100 mg                    



 tablet  tablet                    

 

 Cardizem CD  Cardizem CD  2019    Yes  [place]    Unknown  Unknown      



 120 mg  120 mg  1-04      Jen JEFF            



 capsule,ext  capsule,ext                    



 ended  ended                    



 release  release                    

 

 multivitami  multivitami  2019    Yes  [place]    Unknown  Unknown      



 n with  n with  -      Jen JEFF            



 minerals  minerals                    



 tablet  tablet                    

 

 Co Q-10 150  Co Q-10 150  2019    Yes  [place]    Unknown  Unknown      



 mg capsule  mg capsule  1-04      Jen JEFF            

 

 niacin ER  niacin ER  2019    Yes  [place]    Unknown  Unknown      



 250 mg  250 mg  -04      Jen JEFF            



 tablet,exte  tablet,exte                    



 nded  nded                    



 release  release                    

 

 iron 27mg  iron 27mg  2019    Yes  [place]    Unknown  Unknown      



     1-04      Jen JEFF            

 

 pravastatin  pravastatin  2019    Yes  [place]    Unknown  Unknown      



 40 mg  40 mg  -15      Jen JEFF            



 tablet  tablet                    







Vital Signs







 Vital Name  Observation Time  Observation Value  Comments

 

 SYSTOLIC mm[Hg]  2019 18:09:38  136 mm[Hg] mm[Hg]  Method: Sit

 

 SYSTOLIC mm[Hg]  2019 18:08:41  128 mm[Hg] mm[Hg]  Method: Stand

 

 DIASTOLIC mm[Hg]  2019 18:09:38  80 mm[Hg] mm[Hg]  Method: Sit

 

 DIASTOLIC mm[Hg]  2019 18:08:41  70 mm[Hg] mm[Hg]  Method: Stand

 

 PULSE  2019 18:09:38  72 /min /min  

 

 RESP RATE  2019 18:09:38  16 /min /min  

 

 TEMP  2019 18:09:38  97.8 [degF]  







Procedures

This patient has no known procedures.



Results

This patient has no known results.

## 2020-02-18 NOTE — XMS REPORT
Patient Summary Document

 Created on:February 15, 2020



Patient:PARISH HOUSE Unknown

Sex:Male

:1940

External Reference #:[id number]





Demographics







 Address  [facility] [place] [address]

 

 Home Phone  [phone]

 

 Preferred Language  English

 

 Marital Status  Unknown

 

 [employer]aker Affiliation  Unknown

 

 Race  Unknown

 

 Ethnic Group  Unknown









Author







 Organization  [facility]









Support







 Name  Relationship  Address  Phone

 

 ZI, Unknown Name  NextOfBernardino  Unknown Address  [phone]









Care Team Providers







 Name  Role  Phone

 

 Unavailable  Unavailable  Unavailable









Problems







 Condition  Condition  Condition  Status  Onset  Resolution  Last  Treating  
Comments



 Name  Details  Category    Date  Date  Treatment  Clinician  



             Date    

 

 Hypertensiv  Hypertensiv  Diagnosis  Active        Elmer  



 e heart  e heart            Smith  



 disease  disease            [id number]  



 with heart  with heart              



 failure  failure              

 

 Chronic  Chronic  Diagnosis  Active        Elmer  



 systolic  systolic            [place]  



 (congestive  (congestive            [id number]  



 ) heart  ) heart              



 failure  failure              

 

 Paroxysmal  Paroxysmal  Diagnosis  Active        Elmer  



 atrial  atrial            [place]  



 fibrillatio  fibrillatio            [id number]  



 n  n              

 

 Type 2  Type 2  Diagnosis  Active        Elmer  



 diabetes  diabetes            Smith  



 mellitus  mellitus            [id number]  



 without  without              



 complicatio  complicatio              



 ns  ns              

 

 Diverticuli  Diverticuli  Diagnosis  Active        Elmer  



 tis of  tis of            Smith  



 intestine,  intestine,            [id number]  



 part  part              



 unspecified  unspecified              



 , without  , without              



 perforation  perforation              



 or abscess  or abscess              



 without  without              



 bleeding  bleeding              

 

 Anemia,  Anemia,  Diagnosis  Active        Elmer  



 unspecified  unspecified            [place]  



               [id number]  

 

 Benign  Benign  Diagnosis  Active        Elmer  



 prostatic  prostatic            Smith  



 hyperplasia  hyperplasia            [id number]  



 without  without              



 lower  lower              



 urinary  urinary              



 tract  tract              



 symptoms  symptoms              

 

 Lymphocytop  Lymphocytop  Diagnosis  Active        Elmer  



 enia  enia            [place]  



               [id number]  

 

 Encounter  Encounter  Diagnosis  Active        Elmer  



 for  alka Maloney  



 attention  attention            [id number]  



 to  to              



 colostomy  colostomy              

 

 Long term  Long term  Diagnosis  Active        Elmer  



 (current)  (current)            [place]  



 use of  use of            [id number]  



 anticoagula  anticoagula              



 nts  nts              

 

 Personal  Personal  Diagnosis  Active        Elmer  



 history of  history of            [place]  



 transient  transient            [id number]  



 ischemic  ischemic              



 attack  attack              



 (TIA), and  (TIA), and              



 cerebral  cerebral              



 infarction  infarction              



 without  without              



 residual  residual              



 deficits  deficits              

 

 Presence of  Presence of  Diagnosis  Active        Elmer  



 cardiac  cardiac            [place]  



 pacemaker  pacemaker            [id number]  

 

 Presence of  Presence of  Diagnosis  Active        Elmer  



 prosthetic  prosthetic            Smith  



 heart valve  heart valve            [id number]  

 

 Personal  Personal  Diagnosis  Active        Elmer  



 history of  history of            Smith  



 nicotine  nicotine            [id number]  



 dependence  dependence              

 

 Problems  Problems  Diagnosis  Active        Elmer  



 related to  related to            [place]  



 living  living            [id number]  



 alone  alone              

 

 Pain  frequent  Pain Mgmt  Resolve  2019    Patricia  



   pain    d    10:55:00    [place]  



         10:30:      [id number]  



         00        

 

 Cardio  edema  Cardiovasc  Active  2019      Patricia  



     ular          Morrison  



         10:30:      [id number]  



         00        

 

 Cardio  knowledge/s  Cardiovasc  Active  2019      Patricia  



   kill  ular          [place]rison  



   deficit: pt      10:30:      [id number]  



         00        

 

 Respiratory  dyspnea  Respirator  Resolve  2019    Patricia  



   present  y  d    10:45:00    [place]  



         10:30:      [id number]  



         00        

 

 Endo/Hema  newly  Endo/Hema  Resolve  2019    Patricia  



   diagnosed    d    09:45:00    [place]  



   diabetes      10:30:      [id number]  



         00        

 

 Endo/Hema  diabetic  Endo/Hema  Resolve  2019    Patricia  



   foot care    d    09:45:00    [place]rison  



         10:30:      [id number]  



         00        

 

 Endo/Hema  anti-coagul  Endo/Hema  Resolve  2019    Patricia  



   ation    d    09:45:00    [place]  



   therapy      10:30:      [id number]  



         00        

 

 Sensory  impaired  Sensory  Resolve  2019    Patricia  



   hearing    d    10:45:00    [place]  



         10:30:      [id number]  



         00        

 

 Integument  skin  Integument  Resolve  2019    Patricia  



   integrity    d    11:40:00    [place]  



   risk      10:30:      [id number]  



         00        

 

 Nutrition  nutritional  Nutrition  Resolve  2019    Patricia  



   restriction    d    11:40:00    [place]  



   s      10:30:      [id number]  



         00        

 

 Elimination  ostomy  Eliminatio  Resolve  2019    Patricia  



   present  n  d    11:40:00    [place]  



         10:30:      [id number]  



         00        

 

 Neuro  confusion  Neuro/Emot  Resolve  2019    Patricia  



   present  ion  d    10:45:00    [place]  



         10:30:      [id number]  



         00        

 

 Neuro  impaired  Neuro/Emot  Resolve  2019    Patricia  



   decision-ma  ion  d    10:45:00    [place]  



   felicia      10:30:      [id number]  



         00        

 

 Activity  ADL  Activity  Resolve  2019    Patricia  



   assistance    d    10:45:00    [place]  



   required      10:30:      [id number]  



         00        

 

 Activity  self-care  Activity  Resolve  2019    Patricia  



   deficit    d    10:45:00    [place]  



         10:30:      [id number]  



         00        

 

 Safety  structural  Safety  Resolve  2019    Patricia  



   barriers    d    10:55:00    [place]  



   present      10:30:      [id number]  



         00        

 

 Safety  fall risk  Safety  Resolve  2019    Patricia  



   factor    d    10:55:00    [place]  



   present      10:30:      [id number]  



         00        

 

 Safety  risk for  Safety  Resolve  2019    Patricia  



   hospitaliza    d    10:55:00    [place]  



   tion      10:30:      [id number]  



         00        

 

 Safety  can be left  Safety  Active  2019      Patricia  



   alone for            [place]  



   only short      10:30:      [id number]  



   periods      00        

 

 Safety  safety  Safety  Resolve  2019    QUALITY  



   hazards    d    10:55:00    REALTIME  



   present      10:30:        



         00        

 

 Medication  oral med  Meds  Resolve  2019    Patricia  



   assistance    d    10:55:00    [place]  



   required      10:30:      [id number]  



         00        

 

 Medication  knowledge/s  Meds  Resolve  2019    Patricia  



   kill    d    10:55:00    [place]  



   deficit: pt      10:30:      [id number]  



         00        

 

 Musculoskel  transfer  Musculoske  Resolve  2019    Patricia  



 etal  assistance  letal  d    10:45:00    [place]  



   required      10:30:      [id number]  



         00        

 

 Musculoskel  requires  Musculoske  Resolve  2019    Patricia  



 etal  human  letal  d    10:45:00    [place]  



   assist to      10:30:      [id number]  



   leave home      00        

 

 Cath/Ostomy  k/s  Cath\Ostom  Resolve  2019    Patricia  



 /GI  deficit:  y\GI Care  d    11:40:00    [place]rison  



   cath/ost/GI      10:30:      [id number]  



   care - pt      00        

 

 Balance/End  balance/  PT/OT:  Resolve  2019    Shyma  



 urance  rdination  Balance/En  d    10:45:00    Ebonyziewicz  



   deficit  durance    12:07:      [id number]  



         00        

 

 OT: Self  self-care  OT:  Resolve  2019    Shyam  



 Care  deficit  Self-Care  d    10:45:00    Kobziewicz  



         12:07:      [id number]  



         00        

 

 Gait/Locomo  stair  PT/OT:  Resolve  2019    Shyam  



 tion  management  Gait/Locom  d    10:45:00    Kobziewicz  



 problems  req  otion    12:07:      [id number]  



         00        

 

 Gait/Locomo  gait  PT/OT:  Resolve  2019    Shyam  



 tion  deficit  Gait/Locom  d    10:45:00    Kobziewicz  



 problems    otion    12:07:      [id number]  



         00        

 

 Elimination  ostomy  Eliminatio  Resolve  2019    Elmer  



   present  n  d  1-15  10:55:00    [place]  



         09:55:      [id number]  



         00        

 

 Elimination  urinary  Eliminatio  Resolve  2019    Elmer  



   incontinenc  n  d    10:45:00    [place] gill      10:45:      [id number]  



         00        

 

 Elimination  ostomy  Eliminatio  Resolve  2019    Elmer  



   present  n  d  -  10:45:00    [place]  



         10:45:      [id number]  



         00        

 

 Safety  risk for  Safety  Active  2019      Elmer  



   hospitaliza            Maloney  



   tion      10:50:      [id number]  



         00        

 

 Activity  ADL  Activity  Unknown  2019      Elmer  



   assistance            Maloney  



   required      10:45:      [id number]  



         00        

 

 Safety  fall risk  Safety  Active  2019      Elmer  



   factor            Maloney  



   present      10:45:      [id number]  



         00        

 

 Sensory  impaired  Sensory  Active        [place]  



   hearing            [place]  



         11:30:      [id number]  



         00        

 

 Elimination  urinary  Eliminatio  Resolve  2020    Cherrise  



   incontinenc  n  d    10:55:00    Ramona  



   e      11:30:      [id number]  



         00        

 

 Elimination  urinary  Eliminatio  Resolve  2020    Cherrise  



   urgency  n  d    10:55:00    [place]  



         11:30:      [id number]  



         00        

 

 Elimination  ostomy  Eliminatio  Active        Cherrise  



   present  n          [place]  



         11:30:      [id number]  



         00        

 

 Neuro  impaired  Neuro/Emot  Active        Cherrise  



   decision-ma  ion          [place]  



   felicia      11:30:      [id number]  



         00        

 

 Safety  structural  Safety  Active        Cherrise  



   barriers            [place]  



   present      11:30:      [id number]  



         00        

 

 Safety  sanitation  Safety  Active        Cherrise  



   hazards            [place]  



   present      11:30:      [id number]  



         00        

 

 Musculoskel  requires  Musculoske  Active        Cherrise  



 etal  human  letal          [place]  



   assist to      11:30:      [id number]  



   leave home      00        







Allergies, Adverse Reactions, Alerts







 Allergy  Allergy  Status  Severity  Reaction(s)  Onset  Inactive  Treating  
Comments



 Name  Type        Date  Date  Clinician  

 

 Unknown  None  Active  Unknown  None      Unknown  No Known



                 Allergies



                 For This



                 Patient







Medications







 Ordered  Filled  Start  Stop  Current  Ordering  Indication  Dosage  Frequency
  Signature  Comments  Components



 Medication  Medication  Date  Date  Medication?  Clinician        (SIG)    



 Name  Name                    

 

 Eliquis 2.5  Eliquis 2.5  2019    No  [place]    Unknown  Unknown      



 mg tablet  mg tablet        Jen JEFF            

 

 metoprolol  metoprolol  2019    No  [place]    Unknown  Unknown      



 tartrate  tartrate        Jen JEFF            



 100 mg  100 mg                    



 tablet  tablet                    

 

 Cardizem CD  Cardizem CD  2019    No  [place]    Unknown  Unknown      



 120 mg  120 mg        Jen JEFF            



 capsule,ext  capsule,ext                    



 ended  ended                    



 release  release                    

 

 multivitami  multivitami  2019    No  [place]    Unknown  Unknown      



 n with  n with        Jen JEFF            



 minerals  minerals                    



 tablet  tablet                    

 

 Co Q-10 150  Co Q-10 150  2019    No  [place]    Unknown  Unknown      



 mg capsule  mg capsule        Jen JEFF            

 

 niacin ER  niacin ER  2019    No  [place]    Unknown  Unknown      



 250 mg  250 mg  -      Jen JEFF            



 tablet,exte  tablet,exte                    



 nded  nded                    



 release  release                    

 

 iron 27mg  iron 27mg  2019    No  [place]    Unknown  Unknown      



     -      Jen JEFF            

 

 pravastatin  pravastatin  2019    No  [place]    Unknown  Unknown      



 40 mg  40 mg  1-15      Jen JEFF            



 tablet  tablet                    







Vital Signs







 Vital Name  Observation Time  Observation Value  Comments

 

 SYSTOLIC mm[Hg]  2020 18:10:20  136 mm[Hg] mm[Hg]  Method: Sit

 

 SYSTOLIC mm[Hg]  2019 18:08:41  128 mm[Hg] mm[Hg]  Method: Stand

 

 DIASTOLIC mm[Hg]  2020 18:10:20  68 mm[Hg] mm[Hg]  Method: Sit

 

 DIASTOLIC mm[Hg]  2019 18:08:41  70 mm[Hg] mm[Hg]  Method: Stand

 

 PULSE  2020 18:10:20  68 /min /min  

 

 RESP RATE  2020 18:10:20  16 /min /min  

 

 TEMP  2020 18:10:20  97.8 [degF]  







Procedures

This patient has no known procedures.



Results

This patient has no known results.

## 2020-02-18 NOTE — XMS REPORT
Patient Summary Document

 Created on:2020



Patient:[facility] Unknown

Sex:Male

:1940

External Reference #:[id number]





Demographics







 Address  [facility] [place] [address]

 

 Home Phone  [phone]

 

 Preferred Language  English

 

 Marital Status  Unknown

 

 Amish Affiliation  Unknown

 

 Race  Unknown

 

 Ethnic Group  Unknown









Author







 Organization  [facility]









Support







 Name  Relationship  Address  Phone

 

 ZI, Unknown Name  NextOfBernardino  Unknown Address  [phone]









Care Team Providers







 Name  Role  Phone

 

 Unavailable  Unavailable  Unavailable









Problems







 Condition  Condition  Condition  Status  Onset  Resolution  Last  Treating  
Comments



 Name  Details  Category    Date  Date  Treatment  Clinician  



             Date    

 

 Hypertensiv  Hypertensiv  Diagnosis  Active        Elmer  



 e heart  e heart            [facility]  



 disease  disease            [id number]  



 with heart  with heart              



 failure  failure              

 

 Chronic  Chronic  Diagnosis  Active        Elmer  



 systolic  systolic            [facility]  



 (congestive  (congestive            [id number]  



 ) heart  ) heart              



 failure  failure              

 

 Paroxysmal  Paroxysmal  Diagnosis  Active        Elmer  



 atrial  atrial            [place]  



 fibrillatio  fibrillatio            [id number]  



 n  n              

 

 Type 2  Type 2  Diagnosis  Active        Elmer  



 diabetes  diabetes            [facility]  



 mellitus  mellitus            [id number]  



 without  without              



 complicatio  complicatio              



 ns  ns              

 

 Diverticuli  Diverticuli  Diagnosis  Active        Elmer  



 tis of  tis of            Smith  



 intestine,  intestine,            [id number]  



 part  part              



 unspecified  unspecified              



 , without  , without              



 perforation  perforation              



 or abscess  or abscess              



 without  without              



 bleeding  bleeding              

 

 Anemia,  Anemia,  Diagnosis  Active        Elmer  



 unspecified  unspecified            [place]  



               [id number]  

 

 Benign  Benign  Diagnosis  Active        Elmer  



 prostatic  prostatic            [facility]  



 hyperplasia  hyperplasia            [id number]  



 without  without              



 lower  lower              



 urinary  urinary              



 tract  tract              



 symptoms  symptoms              

 

 Lymphocytop  Lymphocytop  Diagnosis  Active        Elmer  



 enia  enia            [place]  



               [id number]  

 

 Encounter  Encounter  Diagnosis  Active        Elmer  



 for  alka Maloney  



 attention  attention            [id number]  



 to  to              



 colostomy  colostomy              

 

 Long term  Long term  Diagnosis  Active        Elmer  



 (current)  (current)            [place]  



 use of  use of            [id number]  



 anticoagula  anticoagula              



 nts  nts              

 

 Personal  Personal  Diagnosis  Active        Elmer  



 history of  history of            [place]  



 transient  transient            [id number]  



 ischemic  ischemic              



 attack  attack              



 (TIA), and  (TIA), and              



 cerebral  cerebral              



 infarction  infarction              



 without  without              



 residual  residual              



 deficits  deficits              

 

 Presence of  Presence of  Diagnosis  Active        [facility]mer  



 cardiac  cardiac            [place]  



 pacemaker  pacemaker            [id number]  

 

 Presence of  Presence of  Diagnosis  Active        [facility]  



 prosthetic  prosthetic            [facility]  



 heart valve  heart valve            [id number]  

 

 Personal  Personal  Diagnosis  Active        [facility]  



 history of  history of            [facility]  



 nicotine  nicotine            [id number]  



 dependence  dependence              

 

 Problems  Problems  Diagnosis  Active        [facility]  



 related to  related to            [place]  



 living  living            [id number]  



 alone  alone              

 

 Pain  frequent  Pain Mgmt  Resolve  2019    Patricia  



   pain    d    10:55:00    [facility]ley  



         10:30:      [id number]  



         00        

 

 Cardio  edema  Cardiovasc  Active  2019      Patricia  



     ular          [facility]ley  



         10:30:      [id number]  



         00        

 

 Cardio  knowledge/s  Cardiovasc  Active  2019      Patricia  



   kill  ular          [place]  



   deficit: pt      10:30:      [id number]  



         00        

 

 Respiratory  dyspnea  Respirator  Resolve  2019    Patricia  



   present  y  d    10:45:00    [place]ley  



         10:30:      [id number]  



         00        

 

 Endo/Hema  newly  Endo/Hema  Resolve  2019    Patricia  



   diagnosed    d    09:45:00    [place]ley  



   diabetes      10:30:      [id number]  



         00        

 

 Endo/Hema  diabetic  Endo/Hema  Resolve  2019    Patricia  



   foot care    d    09:45:00    [place]ley  



         10:30:      [id number]  



         00        

 

 Endo/Hema  anti-coagul  Endo/Hema  Resolve  2019    Patricia  



   ation    d    09:45:00    Kinsley  



   therapy      10:30:      [id number]  



         00        

 

 Sensory  impaired  Sensory  Resolve  2019    Patricia  



   hearing    d    10:45:00    Kinsley  



         10:30:      [id number]  



         00        

 

 Integument  skin  Integument  Resolve  2019    Patricia  



   integrity    d    11:40:00    [place]  



   risk      10:30:      [id number]  



         00        

 

 Nutrition  nutritional  Nutrition  Resolve  2019    Patricia  



   restriction    d    11:40:00    [place]  



   s      10:30:      [id number]  



         00        

 

 Elimination  ostomy  Eliminatio  Resolve  2019    [facility]ricia  



   present  n  d    11:40:00    [place]  



         10:30:      [id number]  



         00        

 

 Neuro  confusion  Neuro/Emot  Resolve  2019    Patricia  



   present  ion  d    10:45:00    [place]  



         10:30:      [id number]  



         00        

 

 Neuro  impaired  Neuro/Emot  Resolve  2019    Patricia  



   decision-ma  ion  d    10:45:00    [place]  



   felicia      10:30:      [id number]  



         00        

 

 Activity  ADL  Activity  Resolve  2019    [facility]  



   assistance    d    10:45:00    [facility]  



   required      10:30:      [id number]  



         00        

 

 Activity  self-care  Activity  Resolve  2019    Patricia  



   deficit    d    10:45:00    [place]ley  



         10:30:      [id number]  



         00        

 

 Safety  structural  Safety  Resolve  2019    Patricia  



   barriers    d    10:55:00    [place]  



   present      10:30:      [id number]  



         00        

 

 Safety  fall risk  Safety  Resolve  2019    Patricia  



   factor    d    10:55:00    [place]  



   present      10:30:      [id number]  



         00        

 

 Safety  risk for  Safety  Resolve  2019    Patricia  



   hospitaliza    d    10:55:00    [place]  



   tion      10:30:      [id number]  



         00        

 

 Safety  can be left  Safety  Active  2019      Patricia  



   alone for            [place]ley  



   only short      10:30:      [id number]  



   periods      00        

 

 Safety  safety  Safety  Resolve  2019    QUALITY  



   hazards    d    10:55:00    REALTIME  



   present      10:30:        



         00        

 

 Medication  oral med  Meds  Resolve  2019    [facility]  



   assistance    d    10:55:00    [place]  



   required      10:30:      [id number]  



         00        

 

 Medication  knowledge/s  Meds  Resolve  2019    Patricia  



   kill    d    10:55:00    [facility]ley  



   deficit: pt      10:30:      [id number]  



         00        

 

 Musculoskel  transfer  Musculoske  Resolve  2019    Patricia  



 etal  assistance  letal  d    10:45:00    [place]  



   required      10:30:      [id number]  



         00        

 

 Musculoskel  requires  Musculoske  Resolve  2019    Patricia  



 etal  human  letal  d    10:45:00    [facility]  



   assist to      10:30:      [id number]  



   leave home      00        

 

 Cath/Ostomy  k/s  Cath\Ostom  Resolve  2019    Patricia  



 /GI  deficit:  y\GI Care  d    11:40:00    [facility]ley  



   cath/ost/GI      10:30:      [id number]  



   care - pt      00        

 

 Balance/End  balance/  PT/OT:  Resolve  2019    Shyam kimance  rdination  Balance/En  d    10:45:00    Kobziewicz  



   deficit  durance    12:07:      [id number]  



         00        

 

 OT: Self  self-care  OT:  Resolve  2019    Shyam  



 Care  deficit  Self-Care  d    10:45:00    Kobziewicz  



         12:07:      [id number]  



         00        

 

 Gait/Locomo  stair  PT/OT:  Resolve  2019    Shyam  



 tion  management  Gait/Locom  d    10:45:00    Kobziewicz  



 problems  req  otion    12:07:      [id number]  



         00        

 

 Gait/Locomo  gait  PT/OT:  Resolve  2019    Shyam  



 tion  deficit  Gait/Locom  d    10:45:00    Kobziewicz  



 problems    otion    12:07:      [id number]  



         00        

 

 Elimination  ostomy  Eliminatio  Resolve  2019    Elmer  



   present  n  d  1-15  10:55:00    [place]  



         09:55:      [id number]  



         00        

 

 Elimination  urinary  Eliminatio  Resolve  2019    Elmer  



   incontinenc  n  d  2-11  10:45:00    [place] gill      10:45:      [id number]  



         00        

 

 Elimination  ostomy  Eliminatio  Resolve  2019    Elmer  



   present  n  d  2-11  10:45:00    [place]  



         10:45:      [id number]  



         00        

 

 Safety  risk for  Safety  Active  2019      Elmer  



   hospitaliza            [place]  



   tion      10:50:      [id number]  



         00        

 

 Activity  ADL  Activity  Unknown  2019      Elmer  



   assistance            Maloney  



   required      10:45:      [id number]  



         00        

 

 Safety  fall risk  Safety  Active  2019      Elmer  



   factor            Maloney  



   present      10:45:      [id number]  



         00        







Allergies, Adverse Reactions, Alerts







 Allergy  Allergy  Status  Severity  Reaction(s)  Onset  Inactive  Treating  
Comments



 Name  Type        Date  Date  Clinician  

 

 Unknown  None  Active  Unknown  None      Unknown  No Known



                 Allergies



                 For This



                 Patient







Medications







 Ordered  Filled  Start  Stop  Current  Ordering  Indication  Dosage  Frequency
  Signature  Comments  Components



 Medication  Medication  Date  Date  Medication?  Clinician        (SIG)    



 Name  Name                    

 

 Eliquis 2.5  Eliquis 2.5  2019    Yes  [place]    Unknown  Unknown      



 mg tablet  mg tablet        Jen JEFF            

 

 metoprolol  metoprolol  2019    Yes  [place]    Unknown  Unknown      



 tartrate  tartrate  1-04      Jen JEFF            



 100 mg  100 mg                    



 tablet  tablet                    

 

 Cardizem CD  Cardizem CD  2019    Yes  [place]    Unknown  Unknown      



 120 mg  120 mg  1-04      Jen JEFF            



 capsule,ext  capsule,ext                    



 ended  ended                    



 release  release                    

 

 multivitami  multivitami  2019    Yes  [place]    Unknown  Unknown      



 n with  n with  -      Jen JEFF            



 minerals  minerals                    



 tablet  tablet                    

 

 Co Q-10 150  Co Q-10 150  2019    Yes  [place]    Unknown  Unknown      



 mg capsule  mg capsule  1-04      Jen JEFF            

 

 niacin ER  niacin ER  2019    Yes  [place]    Unknown  Unknown      



 250 mg  250 mg  -04      Jen JEFF            



 tablet,exte  tablet,exte                    



 nded  nded                    



 release  release                    

 

 iron 27mg  iron 27mg  2019    Yes  [place]    Unknown  Unknown      



     1-04      Jen JEFF            

 

 pravastatin  pravastatin  2019    Yes  [place]    Unknown  Unknown      



 40 mg  40 mg  -15      Jen JEFF            



 tablet  tablet                    







Vital Signs







 Vital Name  Observation Time  Observation Value  Comments

 

 SYSTOLIC mm[Hg]  2019 18:09:38  136 mm[Hg] mm[Hg]  Method: Sit

 

 SYSTOLIC mm[Hg]  2019 18:08:41  128 mm[Hg] mm[Hg]  Method: Stand

 

 DIASTOLIC mm[Hg]  2019 18:09:38  80 mm[Hg] mm[Hg]  Method: Sit

 

 DIASTOLIC mm[Hg]  2019 18:08:41  70 mm[Hg] mm[Hg]  Method: Stand

 

 PULSE  2019 18:09:38  72 /min /min  

 

 RESP RATE  2019 18:09:38  16 /min /min  

 

 TEMP  2019 18:09:38  97.8 [degF]  







Procedures

This patient has no known procedures.



Results

This patient has no known results.

## 2020-02-18 NOTE — XMS REPORT
Patient Summary Document

 Created on:2020



Patient:PARISH HOUSE Unknown

Sex:Male

:1940

External Reference #:[id number]





Demographics







 Address  [place] [address]

 

 Home Phone  [phone]

 

 Preferred Language  English

 

 Marital Status  Unknown

 

 Mu-ism Affiliation  Unknown

 

 Race  Unknown

 

 Ethnic Group  Unknown









Author







 Organization  [facility]









Support







 Name  Relationship  Address  Phone

 

 ZI, Unknown Name  NextOfBernardino  Unknown Address  [phone]









Care Team Providers







 Name  Role  Phone

 

 Unavailable  Unavailable  Unavailable









Problems







 Condition  Condition  Condition  Status  Onset  Resolution  Last  Treating  
Comments



 Name  Details  Category    Date  Date  Treatment  Clinician  



             Date    

 

 Hypertensiv  Hypertensiv  Diagnosis  Active        Elmer  



 e heart  e heart            Smith  



 disease  disease            [id number]  



 with heart  with heart              



 failure  failure              

 

 Chronic  Chronic  Diagnosis  Active        Elmer  



 systolic  systolic            Haidre  



 (congestive  (congestive            [id number]  



 ) heart  ) heart              



 failure  failure              

 

 Paroxysmal  Paroxysmal  Diagnosis  Active        Elmer  



 atrial  atrial            Haider  



 fibrillatio  fibrillatio            [id number]  



 n  n              

 

 Type 2  Type 2  Diagnosis  Active        Elmer  



 diabetes  diabetes            Smith  



 mellitus  mellitus            [id number]  



 without  without              



 complicatio  complicatio              



 ns  ns              

 

 Diverticuli  Diverticuli  Diagnosis  Active        Elmer  



 tis of  tis of            Smith  



 intestine,  intestine,            [id number]  



 part  part              



 unspecified  unspecified              



 , without  , without              



 perforation  perforation              



 or abscess  or abscess              



 without  without              



 bleeding  bleeding              

 

 Anemia,  Anemia,  Diagnosis  Active        Elmer  



 unspecified  unspecified            Haider  



               [id number]  

 

 Benign  Benign  Diagnosis  Active        Elmer  



 prostatic  prostatic            Smith  



 hyperplasia  hyperplasia            [id number]  



 without  without              



 lower  lower              



 urinary  urinary              



 tract  tract              



 symptoms  symptoms              

 

 Lymphocytop  Lymphocytop  Diagnosis  Active        Elmer  



 enia  enia            Haider  



               [id number]  

 

 Encounter  Encounter  Diagnosis  Active        Elmer  



 for  alka Maloney  



 attention  attention            [id number]  



 to  to              



 colostomy  colostomy              

 

 Long term  Long term  Diagnosis  Active        Elmer  



 (current)  (current)            Haider  



 use of  use of            [id number]  



 anticoagula  anticoagula              



 nts  nts              

 

 Personal  Personal  Diagnosis  Active        Elmer  



 history of  history of            Haider  



 transient  transient            [id number]  



 ischemic  ischemic              



 attack  attack              



 (TIA), and  (TIA), and              



 cerebral  cerebral              



 infarction  infarction              



 without  without              



 residual  residual              



 deficits  deficits              

 

 Presence of  Presence of  Diagnosis  Active        Elmer  



 cardiac  cardiac            [place]ider  



 pacemaker  pacemaker            [id number]  

 

 Presence of  Presence of  Diagnosis  Active        Elmer  



 prosthetic  prosthetic            Smith  



 heart valve  heart valve            [id number]  

 

 Personal  Personal  Diagnosis  Active        Elmer  



 history of  history of            Smith  



 nicotine  nicotine            [id number]  



 dependence  dependence              

 

 Problems  Problems  Diagnosis  Active        Elmer  



 related to  related to            [place]  



 living  living            [id number]  



 alone  alone              

 

 Pain  frequent  Pain Mgmt  Resolve  2019    Patricia  



   pain    d    10:55:00    Ashby  



         10:30:      [id number]  



         00        

 

 Cardio  edema  Cardiovasc  Active  2019      Patricia  



     ular          Ashby  



         10:30:      [id number]  



         00        

 

 Cardio  knowledge/s  Cardiovasc  Active  2019      Patricia  



   kill  ular          Ashby  



   deficit: pt      10:30:      [id number]  



         00        

 

 Respiratory  dyspnea  Respirator  Resolve  2019    Patricia  



   present  y  d    10:45:00    [place]  



         10:30:      [id number]  



         00        

 

 Endo/Hema  newly  Endo/Hema  Resolve  2019    Patricia  



   diagnosed    d    09:45:00    Ashby  



   diabetes      10:30:      [id number]  



         00        

 

 Endo/Hema  diabetic  Endo/Hema  Resolve  2019    Patricia  



   foot care    d    09:45:00    Ashby  



         10:30:      [id number]  



         00        

 

 Endo/Hema  anti-coagul  Endo/Hema  Resolve  2019    Patricia  



   ation    d    09:45:00    Ashby  



   therapy      10:30:      [id number]  



         00        

 

 Sensory  impaired  Sensory  Resolve  2019    Patricia  



   hearing    d    10:45:00    [place]  



         10:30:      [id number]  



         00        

 

 Integument  skin  Integument  Resolve  2019    Patricia  



   integrity    d    11:40:00    [place]  



   risk      10:30:      [id number]  



         00        

 

 Nutrition  nutritional  Nutrition  Resolve  2019    Patricia  



   restriction    d    11:40:00    [place]  



   s      10:30:      [id number]  



         00        

 

 Elimination  ostomy  Eliminatio  Resolve  2019    Patricia  



   present  n  d    11:40:00    [place]  



         10:30:      [id number]  



         00        

 

 Neuro  confusion  Neuro/Emot  Resolve  2019    Patricia  



   present  ion  d    10:45:00    [place]  



         10:30:      [id number]  



         00        

 

 Neuro  impaired  Neuro/Emot  Resolve  2019    Patricia  



   decision-ma  ion  d    10:45:00    [place]  



   felicia      10:30:      [id number]  



         00        

 

 Activity  ADL  Activity  Resolve  2019    Patricia  



   assistance    d    10:45:00    Harper  



   required      10:30:      [id number]  



         00        

 

 Activity  self-care  Activity  Resolve  2019    Patricia  



   deficit    d    10:45:00    [place]  



         10:30:      [id number]  



         00        

 

 Safety  structural  Safety  Resolve  2019    Patricia  



   barriers    d    10:55:00    [place]by  



   present      10:30:      [id number]  



         00        

 

 Safety  fall risk  Safety  Resolve  2019    Patricia  



   factor    d    10:55:00    [place]  



   present      10:30:      [id number]  



         00        

 

 Safety  risk for  Safety  Resolve  2019    Patricia  



   hospitaliza    d    10:55:00    [place]  



   tion      10:30:      [id number]  



         00        

 

 Safety  can be left  Safety  Active  2019      Patricia  



   alone for            Ashby  



   only short      10:30:      [id number]  



   periods      00        

 

 Safety  safety  Safety  Resolve  2019    QUALITY  



   hazards    d    10:55:00    REALTIME  



   present      10:30:        



         00        

 

 Medication  oral med  Meds  Resolve  2019    Patricia  



   assistance    d    10:55:00    Ashby  



   required      10:30:      [id number]  



         00        

 

 Medication  knowledge/s  Meds  Resolve  2019    Patricia  



   kill    d    10:55:00    Ashby  



   deficit: pt      10:30:      [id number]  



         00        

 

 Musculoskel  transfer  Musculoske  Resolve  2019    Patricia  



 etal  assistance  letal  d    10:45:00    [place]  



   required      10:30:      [id number]  



         00        

 

 Musculoskel  requires  Musculoske  Resolve  2019    Patricia  



 etal  human  letal  d    10:45:00    Harper  



   assist to      10:30:      [id number]  



   leave home      00        

 

 Cath/Ostomy  k/s  Cath\Ostom  Resolve  2019    Patricia  



 /GI  deficit:  y\GI Care  d    11:40:00    Ashby  



   cath/ost/GI      10:30:      [id number]  



   care - pt      00        

 

 Balance/End  balance/  PT/OT:  Resolve  2019    Shyam  



 urance  rdination  Balance/En  d    10:45:00    Ebonyziewicz  



   deficit  durance    12:07:      [id number]  



         00        

 

 OT: Self  self-care  OT:  Resolve  2019    Shyam  



 Care  deficit  Self-Care  d    10:45:00    Kobziewicz  



         12:07:      [id number]  



         00        

 

 Gait/Locomo  stair  PT/OT:  Resolve  2019    Shyam  



 tion  management  Gait/Locom  d    10:45:00    Kobziewicz  



 problems  req  otion    12:07:      [id number]  



         00        

 

 Gait/Locomo  gait  PT/OT:  Resolve  2019    Shyam  



 tion  deficit  Gait/Locom  d    10:45:00    Kobziewicz  



 problems    otion    12:07:      [id number]  



         00        

 

 Elimination  ostomy  Eliminatio  Resolve  2019    Elmer  



   present  n  d  1-15  10:55:00    Haider  



         09:55:      [id number]  



         00        

 

 Elimination  urinary  Eliminatio  Resolve  2019    Elmer  



   incontinenc  n  d    10:45:00    Haider gill      10:45:      [id number]  



         00        

 

 Elimination  ostomy  Eliminatio  Resolve  2019    Elmer  



   present  n  d  -  10:45:00    Haider  



         10:45:      [id number]  



         00        

 

 Safety  risk for  Safety  Active  2019      Elmer  



   hospitaliza            Maloney  



   tion      10:50:      [id number]  



         00        

 

 Activity  ADL  Activity  Unknown  2019      Elmer  



   assistance            Maloney  



   required      10:45:      [id number]  



         00        

 

 Safety  fall risk  Safety  Active  2019      Elmer  



   factor            Maloney  



   present      10:45:      [id number]  



         00        

 

 Sensory  impaired  Sensory  Active        Cherrise  



   hearing            [place]  



         11:30:      [id number]  



         00        

 

 Elimination  urinary  Eliminatio  Resolve  2020    Cherrise  



   incontinenc  n  d    10:55:00    Ramona  



   e      11:30:      [id number]  



         00        

 

 Elimination  urinary  Eliminatio  Resolve  2020    Cherrise  



   urgency  n  d    10:55:00    [place]  



         11:30:      [id number]  



         00        

 

 Elimination  ostomy  Eliminatio  Active        Cherrise  



   present  n          Ramona  



         11:30:      [id number]  



         00        

 

 Neuro  impaired  Neuro/Emot  Active        Cherrise  



   decision-ma  ion          [place]  



   felicia      11:30:      [id number]  



         00        

 

 Safety  structural  Safety  Active        Cherrise  



   barriers            [place]  



   present      11:30:      [id number]  



         00        

 

 Safety  sanitation  Safety  Active        Cherrise  



   hazards            [place]  



   present      11:30:      [id number]  



         00        

 

 Musculoskel  requires  Musculoske  Active        Cherrise  



 etal  human  letal          [place]  



   assist to      11:30:      [id number]  



   leave home      00        







Allergies, Adverse Reactions, Alerts







 Allergy  Allergy  Status  Severity  Reaction(s)  Onset  Inactive  Treating  
Comments



 Name  Type        Date  Date  Clinician  

 

 Unknown  None  Active  Unknown  None      Unknown  No Known



                 Allergies



                 For This



                 Patient







Medications







 Ordered  Filled  Start  Stop  Current  Ordering  Indication  Dosage  Frequency
  Signature  Comments  Components



 Medication  Medication  Date  Date  Medication?  Clinician        (SIG)    



 Name  Name                    

 

 Eliquis 2.5  Eliquis 2.5  2019    No  Boone    Unknown  Unknown      



 mg tablet  mg tablet        Jen JEFF            

 

 metoprolol  metoprolol  2019    No  Fransico    Unknown  Unknown      



 tartrate  tartrate        Jen JEFF            



 100 mg  100 mg                    



 tablet  tablet                    

 

 Cardizem CD  Cardizem CD  2019    No  Boone    Unknown  Unknown      



 120 mg  120 mg  -      Jen JEFF            



 capsule,ext  capsule,ext                    



 ended  ended                    



 release  release                    

 

 multivitami  multivitami  2019    No  Boone    Unknown  Unknown      



 n with  n with        Jen JEFF            



 minerals  minerals                    



 tablet  tablet                    

 

 Co Q-10 150  Co Q-10 150  2019    No  Boone    Unknown  Unknown      



 mg capsule  mg capsule  -      Jen JEFF            

 

 niacin ER  niacin ER  2019    No  Boone    Unknown  Unknown      



 250 mg  250 mg  -      Jen JEFF            



 tablet,exte  tablet,exte                    



 nded  nded                    



 release  release                    

 

 iron 27mg  iron 27mg  2019    No  Boone    Unknown  Unknown      



     -      Jen JEFF            

 

 pravastatin  pravastatin  2019    Yes  Fransico    Unknown  Unknown      



 40 mg  40 mg  1-15      Jen JEFF            



 tablet  tablet                    







Vital Signs







 Vital Name  Observation Time  Observation Value  Comments

 

 SYSTOLIC mm[Hg]  2020 18:10:20  136 mm[Hg] mm[Hg]  Method: Sit

 

 SYSTOLIC mm[Hg]  2019 18:08:41  128 mm[Hg] mm[Hg]  Method: Stand

 

 DIASTOLIC mm[Hg]  2020 18:10:20  68 mm[Hg] mm[Hg]  Method: Sit

 

 DIASTOLIC mm[Hg]  2019 18:08:41  70 mm[Hg] mm[Hg]  Method: Stand

 

 PULSE  2020 18:10:20  68 /min /min  

 

 RESP RATE  2020 18:10:20  16 /min /min  

 

 TEMP  2020 18:10:20  97.8 [degF]  







Procedures

This patient has no known procedures.



Results

This patient has no known results.

## 2020-02-18 NOTE — XMS REPORT
Patient Summary Document

 Created on:2020



Patient:PARISH HOUSE Unknown

Sex:Male

:1940

External Reference #:[id number]





Demographics







 Address  [facility] [place] [address]

 

 Home Phone  [phone]

 

 Preferred Language  English

 

 Marital Status  Unknown

 

 [employer] Affiliation  Unknown

 

 Race  Unknown

 

 Ethnic Group  Unknown









Author







 Organization  [facility]









Support







 Name  Relationship  Address  Phone

 

 ZI, Unknown Name  NextOfBernardino  Unknown Address  [phone]









Care Team Providers







 Name  Role  Phone

 

 Unavailable  Unavailable  Unavailable









Problems







 Condition  Condition  Condition  Status  Onset  Resolution  Last  Treating  
Comments



 Name  Details  Category    Date  Date  Treatment  Clinician  



             Date    

 

 Hypertensiv  Hypertensiv  Diagnosis  Active        Elmer  



 e heart  e heart            Smith  



 disease  disease            [id number]  



 with heart  with heart              



 failure  failure              

 

 Chronic  Chronic  Diagnosis  Active        Elmer  



 systolic  systolic            [place]  



 (congestive  (congestive            [id number]  



 ) heart  ) heart              



 failure  failure              

 

 Paroxysmal  Paroxysmal  Diagnosis  Active        Elmer  



 atrial  atrial            [place]  



 fibrillatio  fibrillatio            [id number]  



 n  n              

 

 Type 2  Type 2  Diagnosis  Active        Elmer  



 diabetes  diabetes            Smith  



 mellitus  mellitus            [id number]  



 without  without              



 complicatio  complicatio              



 ns  ns              

 

 Diverticuli  Diverticuli  Diagnosis  Active        Elmer  



 tis of  tis of            Smith  



 intestine,  intestine,            [id number]  



 part  part              



 unspecified  unspecified              



 , without  , without              



 perforation  perforation              



 or abscess  or abscess              



 without  without              



 bleeding  bleeding              

 

 Anemia,  Anemia,  Diagnosis  Active        Elmer  



 unspecified  unspecified            [place]  



               [id number]  

 

 Benign  Benign  Diagnosis  Active        Elmer  



 prostatic  prostatic            Smith  



 hyperplasia  hyperplasia            [id number]  



 without  without              



 lower  lower              



 urinary  urinary              



 tract  tract              



 symptoms  symptoms              

 

 Lymphocytop  Lymphocytop  Diagnosis  Active        Elmer  



 enia  enia            [place]  



               [id number]  

 

 Encounter  Encounter  Diagnosis  Active        Elmer  



 for  alka Maloney  



 attention  attention            [id number]  



 to  to              



 colostomy  colostomy              

 

 Long term  Long term  Diagnosis  Active        Elmer  



 (current)  (current)            [place]  



 use of  use of            [id number]  



 anticoagula  anticoagula              



 nts  nts              

 

 Personal  Personal  Diagnosis  Active        Elmer  



 history of  history of            [place]  



 transient  transient            [id number]  



 ischemic  ischemic              



 attack  attack              



 (TIA), and  (TIA), and              



 cerebral  cerebral              



 infarction  infarction              



 without  without              



 residual  residual              



 deficits  deficits              

 

 Presence of  Presence of  Diagnosis  Active        Elmer  



 cardiac  cardiac            [place]  



 pacemaker  pacemaker            [id number]  

 

 Presence of  Presence of  Diagnosis  Active        Elmer  



 prosthetic  prosthetic            Smith  



 heart valve  heart valve            [id number]  

 

 Personal  Personal  Diagnosis  Active        Elmer  



 history of  history of            Smith  



 nicotine  nicotine            [id number]  



 dependence  dependence              

 

 Problems  Problems  Diagnosis  Active        Elmer  



 related to  related to            [place]  



 living  living            [id number]  



 alone  alone              

 

 Pain  frequent  Pain Mgmt  Resolve  2019    Patricia  



   pain    d    10:55:00    [place]  



         10:30:      [id number]  



         00        

 

 Cardio  edema  Cardiovasc  Active  2019      Patricia  



     ular          [place]  



         10:30:      [id number]  



         00        

 

 Cardio  knowledge/s  Cardiovasc  Active  2019      Patricia  



   kill  ular          [place]  



   deficit: pt      10:30:      [id number]  



         00        

 

 Respiratory  dyspnea  Respirator  Resolve  2019    Patricia  



   present  y  d    10:45:00    [place]  



         10:30:      [id number]  



         00        

 

 Endo/Hema  newly  Endo/Hema  Resolve  2019    Patricia  



   diagnosed    d    09:45:00    [place]  



   diabetes      10:30:      [id number]  



         00        

 

 Endo/Hema  diabetic  Endo/Hema  Resolve  2019    Patricia  



   foot care    d    09:45:00    [place]  



         10:30:      [id number]  



         00        

 

 Endo/Hema  anti-coagul  Endo/Hema  Resolve  2019    Patricia  



   ation    d    09:45:00    [place]  



   therapy      10:30:      [id number]  



         00        

 

 Sensory  impaired  Sensory  Resolve  2019    Patricia  



   hearing    d    10:45:00    [place]  



         10:30:      [id number]  



         00        

 

 Integument  skin  Integument  Resolve  2019    Patricia  



   integrity    d    11:40:00    [place]  



   risk      10:30:      [id number]  



         00        

 

 Nutrition  nutritional  Nutrition  Resolve  2019    Patricia  



   restriction    d    11:40:00    [place]  



   s      10:30:      [id number]  



         00        

 

 Elimination  ostomy  Eliminatio  Resolve  2019    Patricia  



   present  n  d    11:40:00    [place]  



         10:30:      [id number]  



         00        

 

 Neuro  confusion  Neuro/Emot  Resolve  2019    Patricia  



   present  ion  d    10:45:00    [place]  



         10:30:      [id number]  



         00        

 

 Neuro  impaired  Neuro/Emot  Resolve  2019    Patricia  



   decision-ma  ion  d    10:45:00    [place]  



   felicia      10:30:      [id number]  



         00        

 

 Activity  ADL  Activity  Resolve  2019    Patricia  



   assistance    d    10:45:00    [place]  



   required      10:30:      [id number]  



         00        

 

 Activity  self-care  Activity  Resolve  2019    Patricia  



   deficit    d    10:45:00    [place]  



         10:30:      [id number]  



         00        

 

 Safety  structural  Safety  Resolve  2019    Patricia  



   barriers    d    10:55:00    [place]  



   present      10:30:      [id number]  



         00        

 

 Safety  fall risk  Safety  Resolve  2019    Patricia  



   factor    d    10:55:00    [place]  



   present      10:30:      [id number]  



         00        

 

 Safety  risk for  Safety  Resolve  2019    Patricia  



   hospitaliza    d    10:55:00    [place]  



   tion      10:30:      [id number]  



         00        

 

 Safety  can be left  Safety  Active  2019      Patricia  



   alone for            [place]  



   only short      10:30:      [id number]  



   periods      00        

 

 Safety  safety  Safety  Resolve  2019    QUALITY  



   hazards    d    10:55:00    REALTIME  



   present      10:30:        



         00        

 

 Medication  oral med  Meds  Resolve  2019    Patricia  



   assistance    d    10:55:00    [place]  



   required      10:30:      [id number]  



         00        

 

 Medication  knowledge/s  Meds  Resolve  2019    Patricia  



   kill    d    10:55:00    [place]  



   deficit: pt      10:30:      [id number]  



         00        

 

 Musculoskel  transfer  Musculoske  Resolve  2019    Patricia  



 etal  assistance  letal  d    10:45:00    [place]  



   required      10:30:      [id number]  



         00        

 

 Musculoskel  requires  Musculoske  Resolve  2019    Patricia  



 etal  human  letal  d    10:45:00    [place]  



   assist to      10:30:      [id number]  



   leave home      00        

 

 Cath/Ostomy  k/s  Cath\Ostom  Resolve  2019    Patricia  



 /GI  deficit:  y\GI Care  d    11:40:00    [place]  



   cath/ost/GI      10:30:      [id number]  



   care - pt      00        

 

 Balance/End  balance/  PT/OT:  Resolve  2019    Shyam kimance  rdination  Balance/En  d    10:45:00    [place]  



   deficit  durance    12:07:      [id number]  



         00        

 

 OT: Self  self-care  OT:  Resolve  2019    Shyam  



 Care  deficit  Self-Care  d    10:45:00    [place]  



         12:07:      [id number]  



         00        

 

 Gait/Locomo  stair  PT/OT:  Resolve  2019    Shyam  



 tion  management  Gait/Locom  d    10:45:00    [place]  



 problems  req  otion    12:07:      [id number]  



         00        

 

 Gait/Locomo  gait  PT/OT:  Resolve  2019    Shyam  



 tion  deficit  Gait/Locom  d    10:45:00    [place]  



 problems    otion    12:07:      [id number]  



         00        

 

 Elimination  ostomy  Eliminatio  Resolve  2019    Elmer  



   present  n  d  1-15  10:55:00    [place]  



         09:55:      [id number]  



         00        

 

 Elimination  urinary  Eliminatio  Resolve  2019    Elmer  



   incontinenc  n  d  2-11  10:45:00    [place] gill      10:45:      [id number]  



         00        

 

 Elimination  ostomy  Eliminatio  Resolve  2019    Elmer  



   present  n  d  2-11  10:45:00    [place]  



         10:45:      [id number]  



         00        

 

 Safety  risk for  Safety  Active  2019      Elmer  



   hospitaliza            [place]  



   tion      10:50:      [id number]  



         00        

 

 Activity  ADL  Activity  Unknown  2019      Elmer  



   assistance            [place]  



   required      10:45:      [id number]  



         00        

 

 Safety  fall risk  Safety  Active  2019      Elmer  



   factor            [place]  



   present      10:45:      [id number]  



         00        







Allergies, Adverse Reactions, Alerts







 Allergy  Allergy  Status  Severity  Reaction(s)  Onset  Inactive  Treating  
Comments



 Name  Type        Date  Date  Clinician  

 

 Unknown  None  Active  Unknown  None      Unknown  No Known



                 Allergies



                 For This



                 Patient







Medications







 Ordered  Filled  Start  Stop  Current  Ordering  Indication  Dosage  Frequency
  Signature  Comments  Components



 Medication  Medication  Date  Date  Medication?  Clinician        (SIG)    



 Name  Name                    

 

 Eliquis 2.5  Eliquis 2.5  2019    Yes  Vesta    Unknown  Unknown      



 mg tablet  mg tablet        Jen JEFF            

 

 metoprolol  metoprolol  2019    Yes  Vesta    Unknown  Unknown      



 tartrate  tartrate  1-04      Jen JEFF            



 100 mg  100 mg                    



 tablet  tablet                    

 

 Cardizem CD  Cardizem CD  2019    Yes  Vesta    Unknown  Unknown      



 120 mg  120 mg  1-04      Jen JEFF            



 capsule,ext  capsule,ext                    



 ended  ended                    



 release  release                    

 

 multivitami  multivitami  2019    Yes  Vesta    Unknown  Unknown      



 n with  n with  -      Jen JEFF            



 minerals  minerals                    



 tablet  tablet                    

 

 Co Q-10 150  Co Q-10 150  2019    Yes  Vesta    Unknown  Unknown      



 mg capsule  mg capsule  1-04      Jen JEFF            

 

 niacin ER  niacin ER  2019    Yes  Vesta    Unknown  Unknown      



 250 mg  250 mg  -04      Jen JEFF            



 tablet,exte  tablet,exte                    



 nded  nded                    



 release  release                    

 

 iron 27mg  iron 27mg  2019    Yes  Vesta    Unknown  Unknown      



     1-04      Jen JEFF            

 

 pravastatin  pravastatin  2019    Yes  Vesta    Unknown  Unknown      



 40 mg  40 mg  -15      Jen JEFF            



 tablet  tablet                    







Vital Signs







 Vital Name  Observation Time  Observation Value  Comments

 

 SYSTOLIC mm[Hg]  2019 18:09:38  136 mm[Hg] mm[Hg]  Method: Sit

 

 SYSTOLIC mm[Hg]  2019 18:08:41  128 mm[Hg] mm[Hg]  Method: Stand

 

 DIASTOLIC mm[Hg]  2019 18:09:38  80 mm[Hg] mm[Hg]  Method: Sit

 

 DIASTOLIC mm[Hg]  2019 18:08:41  70 mm[Hg] mm[Hg]  Method: Stand

 

 PULSE  2019 18:09:38  72 /min /min  

 

 RESP RATE  2019 18:09:38  16 /min /min  

 

 TEMP  2019 18:09:38  97.8 [degF]  







Procedures

This patient has no known procedures.



Results

This patient has no known results.

## 2020-02-18 NOTE — XMS REPORT
Patient Summary Document

 Created on:2020



Patient:PAR[facility] Unknown

Sex:Male

:1940

External Reference #:[id number]





Demographics







 Address  [facility] [address]

 

 Home Phone  [phone]

 

 Preferred Language  English

 

 Marital Status  Unknown

 

 Catholic Affiliation  Unknown

 

 Race  Unknown

 

 Ethnic Group  Unknown









Author







 Organization  [facility]









Support







 Name  Relationship  Address  Phone

 

 ZI, Unknown Name  NextOfBeranrdino  Unknown Address  [phone]









Care Team Providers







 Name  Role  Phone

 

 Unavailable  Unavailable  Unavailable









Problems







 Condition  Condition  Condition  Status  Onset  Resolution  Last  Treating  
Comments



 Name  Details  Category    Date  Date  Treatment  Clinician  



             Date    

 

 Hypertensiv  Hypertensiv  Diagnosis  Active        Elmer  



 e heart  e heart            Smith  



 disease  disease            [id number]  



 with heart  with heart              



 failure  failure              

 

 Chronic  Chronic  Diagnosis  Active        Elmer  



 systolic  systolic            [place]  



 (congestive  (congestive            [id number]  



 ) heart  ) heart              



 failure  failure              

 

 Paroxysmal  Paroxysmal  Diagnosis  Active        Elmer  



 atrial  atrial            [place]  



 fibrillatio  fibrillatio            [id number]  



 n  n              

 

 Type 2  Type 2  Diagnosis  Active        Elmer  



 diabetes  diabetes            Smith  



 mellitus  mellitus            [id number]  



 without  without              



 complicatio  complicatio              



 ns  ns              

 

 Diverticuli  Diverticuli  Diagnosis  Active        Elmer  



 tis of  tis of            Smith  



 intestine,  intestine,            [id number]  



 part  part              



 unspecified  unspecified              



 , without  , without              



 perforation  perforation              



 or abscess  or abscess              



 without  without              



 bleeding  bleeding              

 

 Anemia,  Anemia,  Diagnosis  Active        Elmer  



 unspecified  unspecified            [place]  



               [id number]  

 

 Benign  Benign  Diagnosis  Active        Elmer  



 prostatic  prostatic            Smith  



 hyperplasia  hyperplasia            [id number]  



 without  without              



 lower  lower              



 urinary  urinary              



 tract  tract              



 symptoms  symptoms              

 

 Lymphocytop  Lymphocytop  Diagnosis  Active        Elmer  



 enia  enia            [place]  



               [id number]  

 

 Encounter  Encounter  Diagnosis  Active        Elmer  



 for  alka Maloney  



 attention  attention            [id number]  



 to  to              



 colostomy  colostomy              

 

 Long term  Long term  Diagnosis  Active        Elmer  



 (current)  (current)            [place]  



 use of  use of            [id number]  



 anticoagula  anticoagula              



 nts  nts              

 

 Personal  Personal  Diagnosis  Active        Elmer  



 history of  history of            [place]  



 transient  transient            [id number]  



 ischemic  ischemic              



 attack  attack              



 (TIA), and  (TIA), and              



 cerebral  cerebral              



 infarction  infarction              



 without  without              



 residual  residual              



 deficits  deficits              

 

 Presence of  Presence of  Diagnosis  Active        Elmer  



 cardiac  cardiac            [place]  



 pacemaker  pacemaker            [id number]  

 

 Presence of  Presence of  Diagnosis  Active        Elmer  



 prosthetic  prosthetic            Smith  



 heart valve  heart valve            [id number]  

 

 Personal  Personal  Diagnosis  Active        Elmer  



 history of  history of            Smith  



 nicotine  nicotine            [id number]  



 dependence  dependence              

 

 Problems  Problems  Diagnosis  Active        Elmer  



 related to  related to            [place]  



 living  living            [id number]  



 alone  alone              

 

 Pain  frequent  Pain Mgmt  Resolve  2019    Patricia  



   pain    d    10:55:00    [place]  



         10:30:      [id number]  



         00        

 

 Cardio  edema  Cardiovasc  Active  2019      Patricia  



     ular          [place]  



         10:30:      [id number]  



         00        

 

 Cardio  knowledge/s  Cardiovasc  Active  2019      Patricia  



   kill  ular          [place]  



   deficit: pt      10:30:      [id number]  



         00        

 

 Respiratory  dyspnea  Respirator  Resolve  2019    Patricia  



   present  y  d    10:45:00    [place]  



         10:30:      [id number]  



         00        

 

 Endo/Hema  newly  Endo/Hema  Resolve  2019    Patricia  



   diagnosed    d    09:45:00    [place]  



   diabetes      10:30:      [id number]  



         00        

 

 Endo/Hema  diabetic  Endo/Hema  Resolve  2019    Patricia  



   foot care    d    09:45:00    [place]  



         10:30:      [id number]  



         00        

 

 Endo/Hema  anti-coagul  Endo/Hema  Resolve  2019    Patricia  



   ation    d    09:45:00    [place]  



   therapy      10:30:      [id number]  



         00        

 

 Sensory  impaired  Sensory  Resolve  2019    Patricia  



   hearing    d    10:45:00    [place]  



         10:30:      [id number]  



         00        

 

 Integument  skin  Integument  Resolve  2019    Patricia  



   integrity    d    11:40:00    [place]  



   risk      10:30:      [id number]  



         00        

 

 Nutrition  nutritional  Nutrition  Resolve  2019    Patricia  



   restriction    d    11:40:00    [place]  



   s      10:30:      [id number]  



         00        

 

 Elimination  ostomy  Eliminatio  Resolve  2019    Patricia  



   present  n  d    11:40:00    [place]  



         10:30:      [id number]  



         00        

 

 Neuro  confusion  Neuro/Emot  Resolve  2019    Patricia  



   present  ion  d    10:45:00    [place]  



         10:30:      [id number]  



         00        

 

 Neuro  impaired  Neuro/Emot  Resolve  2019    Patricia  



   decision-ma  ion  d    10:45:00    [place]  



   felicia      10:30:      [id number]  



         00        

 

 Activity  ADL  Activity  Resolve  2019    Patricia  



   assistance    d    10:45:00    [place]  



   required      10:30:      [id number]  



         00        

 

 Activity  self-care  Activity  Resolve  2019    Patricia  



   deficit    d    10:45:00    [place]  



         10:30:      [id number]  



         00        

 

 Safety  structural  Safety  Resolve  2019    Patricia  



   barriers    d    10:55:00    [place]  



   present      10:30:      [id number]  



         00        

 

 Safety  fall risk  Safety  Resolve  2019    Patricia  



   factor    d    10:55:00    [place]  



   present      10:30:      [id number]  



         00        

 

 Safety  risk for  Safety  Resolve  2019    Patricia  



   hospitaliza    d    10:55:00    [place]  



   tion      10:30:      [id number]  



         00        

 

 Safety  can be left  Safety  Active  2019      Patricia  



   alone for            [place]  



   only short      10:30:      [id number]  



   periods      00        

 

 Safety  safety  Safety  Resolve  2019    QUALITY  



   hazards    d    10:55:00    REALTIME  



   present      10:30:        



         00        

 

 Medication  oral med  Meds  Resolve  2019    Patricia  



   assistance    d    10:55:00    [place]  



   required      10:30:      [id number]  



         00        

 

 Medication  knowledge/s  Meds  Resolve  2019    Patricia  



   kill    d    10:55:00    [place]  



   deficit: pt      10:30:      [id number]  



         00        

 

 Musculoskel  transfer  Musculoske  Resolve  2019    Patricia  



 etal  assistance  letal  d    10:45:00    [place]  



   required      10:30:      [id number]  



         00        

 

 Musculoskel  requires  Musculoske  Resolve  2019    Patricia  



 etal  human  letal  d    10:45:00    [place]  



   assist to      10:30:      [id number]  



   leave home      00        

 

 Cath/Ostomy  k/s  Cath\Ostom  Resolve  2019    Patricia  



 /GI  deficit:  y\GI Care  d    11:40:00    [place]  



   cath/ost/GI      10:30:      [id number]  



   care - pt      00        

 

 Balance/End  balance/  PT/OT:  Resolve  2019    Shyam  



 urance  rdination  Balance/En  d    10:45:00    Mary Anneewicz  



   deficit  durance    12:07:      [id number]  



         00        

 

 OT: Self  self-care  OT:  Resolve  2019    Shyam  



 Care  deficit  Self-Care  d    10:45:00    [place]  



         12:07:      [id number]  



         00        

 

 Gait/Locomo  stair  PT/OT:  Resolve  2019    Shyam  



 tion  management  Gait/Locom  d    10:45:00    [place]  



 problems  req  otion    12:07:      [id number]  



         00        

 

 Gait/Locomo  gait  PT/OT:  Resolve  2019    Shyam  



 tion  deficit  Gait/Locom  d    10:45:00    [place]  



 problems    otion    12:07:      [id number]  



         00        

 

 Elimination  ostomy  Eliminatio  Resolve  2019    Elmer  



   present  n  d  1-15  10:55:00    [place]  



         09:55:      [id number]  



         00        

 

 Elimination  urinary  Eliminatio  Resolve  2019    Elmer  



   incontinenc  n  d  2-  10:45:00    [place] gill      10:45:      [id number]  



         00        

 

 Elimination  ostomy  Eliminatio  Resolve  2019    Elmer  



   present  n  d  2-11  10:45:00    [place]  



         10:45:      [id number]  



         00        

 

 Safety  risk for  Safety  Active  2019      Elmer  



   hospitaliza            [place]  



   tion      10:50:      [id number]  



         00        

 

 Activity  ADL  Activity  Unknown  2019      Elmer  



   assistance            [place]  



   required      10:45:      [id number]  



         00        

 

 Safety  fall risk  Safety  Active  2019      Elmer  



   factor            [place]  



   present      10:45:      [id number]  



         00        

 

 Sensory  impaired  Sensory  Active  -      [place]  



   hearing            [place]  



         11:30:      [id number]  



         00        

 

 Elimination  urinary  Eliminatio  Active  -0      [place]  



   incontinenc  n          [place]  



   e      11:30:      [id number]  



         00        

 

 Elimination  urinary  Eliminatio  Active        [place]  



   urgency  n          [place]  



         11:30:      [id number]  



         00        

 

 Elimination  ostomy  Eliminatio  Active  -0      [place]  



   present  n          [place]  



         11:30:      [id number]  



         00        

 

 Neuro  impaired  Neuro/Emot  Active        [place]  



   decision-ma  ion          [place]  



   felicia      11:30:      [id number]  



         00        

 

 Safety  structural  Safety  Active        [place]  



   barriers            [place]  



   present      11:30:      [id number]  



         00        

 

 Safety  sanitation  Safety  Active        [place]  



   hazards            [place]  



   present      11:30:      [id number]  



         00        

 

 Musculoskel  requires  Musculoske  Active        [place]  



 etal  human  letal          [place]  



   assist to      11:30:      [id number]  



   leave home      00        







Allergies, Adverse Reactions, Alerts







 Allergy  Allergy  Status  Severity  Reaction(s)  Onset  Inactive  Treating  
Comments



 Name  Type        Date  Date  Clinician  

 

 Unknown  None  Active  Unknown  None      Unknown  No Known



                 Allergies



                 For This



                 Patient







Medications







 Ordered  Filled  Start  Stop  Current  Ordering  Indication  Dosage  Frequency
  Signature  Comments  Components



 Medication  Medication  Date  Date  Medication?  Clinician        (SIG)    



 Name  Name                    

 

 Eliquis 2.5  Eliquis 2.5  2019    Yes  [place]    Unknown  Unknown      



 mg tablet  mg tablet  1-04      Jne JEFF            

 

 metoprolol  metoprolol  2019    Yes  [place]    Unknown  Unknown      



 tartrate  tartrate  1-04      Jen JEFF            



 100 mg  100 mg                    



 tablet  tablet                    

 

 Cardizem CD  Cardizem CD  2019    Yes  [place]    Unknown  Unknown      



 120 mg  120 mg  1-04      Jen JEFF            



 capsule,ext  capsule,ext                    



 ended  ended                    



 release  release                    

 

 multivitami  multivitami  2019    Yes  [place]    Unknown  Unknown      



 n with  n with  -      Jen JEFF            



 minerals  minerals                    



 tablet  tablet                    

 

 Co Q-10 150  Co Q-10 150  2019    Yes  [place]    Unknown  Unknown      



 mg capsule  mg capsule  1-04      Jen JEFF            

 

 niacin ER  niacin ER  2019    Yes  [place]    Unknown  Unknown      



 250 mg  250 mg  1-04      Jen JEFF            



 tablet,exte  tablet,exte                    



 nded  nded                    



 release  release                    

 

 iron 27mg  iron 27mg  2019    Yes  [place]    Unknown  Unknown      



     1-04      Jen JEFF            

 

 pravastatin  pravastatin  2019    Yes  [place]    Unknown  Unknown      



 40 mg  40 mg  -15      Jen JEFF            



 tablet  tablet                    







Vital Signs







 Vital Name  Observation Time  Observation Value  Comments

 

 SYSTOLIC mm[Hg]  2020 18:09:59  132 mm[Hg] mm[Hg]  Method: Sit

 

 SYSTOLIC mm[Hg]  2019 18:08:41  128 mm[Hg] mm[Hg]  Method: Stand

 

 DIASTOLIC mm[Hg]  2020 18:09:59  64 mm[Hg] mm[Hg]  Method: Sit

 

 DIASTOLIC mm[Hg]  2019 18:08:41  70 mm[Hg] mm[Hg]  Method: Stand

 

 PULSE  2020 18:09:59  70 /min /min  

 

 RESP RATE  2020 18:09:59  16 /min /min  

 

 TEMP  2020 18:09:59  99.7 [degF]  







Procedures

This patient has no known procedures.



Results

This patient has no known results.

## 2020-02-18 NOTE — ED
Complex/Multi-Sys Presentation





- HPI Summary


HPI Summary: 


79 year old male presents with a low sodium yesterday.  He had lab work drawn 

and had a sodium of 123 so was sent in for IV fluids.  He states he felt fine.  

He has been eating and drinking as normal.  No nausea or vomiting.  No 

diarrhea.  no abdominal pain.  He denies any chest pain or shortness breath.  

Does have a history of CHF.  No recent medication changes. no increase swelling 

in his legs.  No cough.  No recent illnesses.  





- History Of Current Complaint


Chief Complaint: EDGeneral


Time Seen by Provider: 02/18/20 18:22





- Allergies/Home Medications


Allergies/Adverse Reactions: 


 Allergies











Allergy/AdvReac Type Severity Reaction Status Date / Time


 


amiodarone Allergy Severe Unknown Verified 02/18/20 18:48





   Reaction  





   Details  














PMH/Surg Hx/FS Hx/Imm Hx


Endocrine/Hematology History: Reports: Hx Anticoagulant Therapy - Coumadin, Hx 

Blood Transfusions, Hx Diabetes - borderline, Hx Anemia, Hx Unexplained 

Bleeding - nose bleeds as a kid


   Denies: Hx Blood Disorders, Hx Bone Marrow Disease, Hx Systemic Lupus 

Erythematosus, Hx Sickle Cell Disease, Hx Thyroid Disease, Other Endocrine/

Hematological Disorders


Cardiovascular History: Reports: Hx Congenital Heart Disease, Hx Coronary 

Artery Disease, Hx Deep Vein Thrombosis - heart, Hx Embolism, Hx 

Hypercholesterolemia, Hx Hypertension, Hx Pacemaker/ICD, Hx Syncope, Other 

Cardiovascular Problems/Disorders - a-fib, ventura tachy syn


   Denies: Hx Aneurysm, Hx Angina, Hx Angioplasty, Hx Auto Implanted Cardiovert 

Defib, Hx Cardiac Arrest, Hx Cardiomegaly, Hx Congestive Heart Failure, Hx 

Peripheral Vascular Disease, Hx Rheumatic Fever


   Comment Only: Hx Valvular Heart Disease - repolacement


Respiratory History: Reports: Other Respiratory Problems/Disorders - sob with 

exertion


   Denies: Hx Pulmonary Edema, Hx Pulmonary Embolism


GI History: Reports: Hx Diverticulosis, Hx Hiatal Hernia - in belly button, 

Other GI Disorders - infection current


   Denies: Hx Cirrhosis, Hx Crohn's Disease, Hx Gall Bladder Disease, Hx 

Gastroesophageal Reflux Disease, Hx Irritable Bowel, Hx Jaundice, Hx Ulcer


 History: Reports: Hx Benign Prostatic Hyperplasia


   Denies: Hx Renal Disease


Musculoskeletal History: Reports: Hx Arthritis, Hx Back Problems - back pain, 

Hx Tendonitis


   Denies: Hx Bursitis, Hx Congenital Bone Abnormalities, Hx Fibromyalgia, Hx 

Gout, Hx Orthopedic Injury, Other Musculoskeletal History


Sensory History: Reports: Hx Contacts or Glasses, Hx Hearing Aid - glasses, Hx 

Hearing Problem


   Denies: Hx Cataracts, Hx Eye Injury, Hx Glaucoma, Hx Legally Blind


Opthamlomology History: Reports: Hx Contacts or Glasses


   Denies: Hx Cataracts, Hx Eye Injury, Hx Glaucoma, Hx Legally Blind


Neurological History: Reports: Hx Headaches


   Denies: Hx Seizures


Psychiatric History: Reports: Hx Depression


   Denies: Hx Panic Disorder





- Cancer History


Hx Chemotherapy: No





- Surgical History


Surgery Procedure, Year, and Place: APPENDECTOMY & TONSILECTOMY.  Valve 

replacement in heart 2014 - [place].  right wrist I and D 2014,colostomy


Hx Anesthesia Reactions: No


Infectious Disease History: No


Infectious Disease History: 


   Denies: Hx Hepatitis, Traveled Outside the [place] in Last 30 Days





- Family History


Known Family History: Positive: Non-Contributory





- Social History


Alcohol Use: None


Substance Use Type: Reports: None


Smoking Status (MU): Former Smoker


Amount Used/How Often: 30+ years ago


Have You Smoked in the Last Year: No





Review of Systems


Negative: Fever


Negative: Chest Pain


Negative: Shortness Of Breath


All Other Systems Reviewed And Are Negative: Yes





Physical Exam


Triage Information Reviewed: Yes


Vital Signs On Initial Exam: 


 Initial Vitals











Temp Pulse Resp BP Pulse Ox


 


 98.7 F   85   18   182/105   95 


 


 02/18/20 16:22  02/18/20 16:22  02/18/20 16:22  02/18/20 16:22  02/18/20 16:22











Vital Signs Reviewed: Yes


Appearance: Positive: Well-Appearing


Skin: Positive: Warm, Dry


Head/Face: Positive: Normal Head/Face Inspection


Eyes: Positive: Normal, Conjunctiva Clear


ENT: Positive: Pharynx normal


Respiratory/Lung Sounds: Positive: Clear to Auscultation, Breath Sounds Present


Cardiovascular: Positive: Normal, RRR


Abdomen Description: Positive: Nontender, Soft


Bowel Sounds: Positive: Present


Musculoskeletal: Positive: Normal


Neurological: Positive: Normal


Psychiatric: Positive: Normal





Procedures





- Sedation


Patient Received Moderate/Deep Sedation with Procedure: No





Diagnostics





- Vital Signs


 Vital Signs











  Temp Pulse Resp BP Pulse Ox


 


 02/18/20 19:00   62    91


 


 02/18/20 18:47   63   171/95  94


 


 02/18/20 18:45   64    93


 


 02/18/20 16:22  98.7 F  85  18  182/105  95














- Laboratory


Lab Results: 


 Lab Results











  02/18/20 02/18/20 Range/Units





  17:25 17:25 


 


WBC  5.9   (3.5-10.8)  10^3/uL


 


RBC  4.67   (4.18-5.48)  10^6 /uL


 


Hgb  14.4   (14.0-18.0)  g/dL


 


Hct  42   (42-52)  %


 


MCV  89   (80-94)  fL


 


MCH  31   (27-31)  pg


 


MCHC  35   (31-36)  g/dL


 


RDW  14   (10-15)  %


 


Plt Count  234   (150-450)  10^3/uL


 


MPV  6.5 L   (7.4-10.4)  fL


 


Neut % (Auto)  65.9   %


 


Lymph % (Auto)  16.0   %


 


Mono % (Auto)  14.8   %


 


Eos % (Auto)  2.8   %


 


Baso % (Auto)  0.5   %


 


Absolute Neuts (auto)  3.9   (1.5-7.7)  10^3/ul


 


Absolute Lymphs (auto)  0.9 L   (1.0-4.8)  10^3/ul


 


Absolute Monos (auto)  0.9 H   (0-0.8)  10^3/ul


 


Absolute Eos (auto)  0.2   (0-0.6)  10^3/ul


 


Absolute Basos (auto)  0.0   (0-0.2)  10^3/ul


 


Absolute Nucleated RBC  0.0   10^3/ul


 


Nucleated RBC %  0.0   


 


Sodium   127 L  (135-145)  mmol/L


 


Potassium   4.1  (3.5-5.0)  mmol/L


 


Chloride   95 L  (101-111)  mmol/L


 


Carbon Dioxide   27  (22-32)  mmol/L


 


Anion Gap   5  (2-11)  mmol/L


 


BUN   13  (6-24)  mg/dL


 


Creatinine   1.09  (0.67-1.17)  mg/dL


 


Est GFR ( Amer)   78.8  (>60)  


 


Est GFR (Non-Af Amer)   65.1  (>60)  


 


BUN/Creatinine Ratio   11.9  (8-20)  


 


Glucose   114 H  ()  mg/dL


 


Calcium   9.3  (8.6-10.3)  mg/dL


 


Magnesium   1.9  (1.9-2.7)  mg/dL


 


Total Bilirubin   0.60  (0.2-1.0)  mg/dL


 


AST   25  (13-39)  U/L


 


ALT   20  (7-52)  U/L


 


Alkaline Phosphatase   67  ()  U/L


 


Total Protein   7.1  (6.4-8.9)  g/dL


 


Albumin   4.0  (3.2-5.2)  g/dL


 


Globulin   3.1  (2-4)  g/dL


 


Albumin/Globulin Ratio   1.3  (1-3)  











Result Diagrams: 


 02/18/20 17:25





 02/18/20 20:29


Lab Statement: Any lab studies that have been ordered have been reviewed, and 

results considered in the medical decision making process.





Complex Multi-Symp Course/Dx


Course Of Treatment: 79 year old male presents with a low sodium yesterday.  He 

had lab work drawn and had a sodium of 123 so was sent in for IV fluids.  He 

states he felt fine.  He has been eating and drinking as normal.  No nausea or 

vomiting.  No diarrhea.  no abdominal pain.  He denies any chest pain or 

shortness breath.  Does have a history of CHF.  No recent medication changes. 

no increase swelling in his legs.  No cough.  No recent illnesses.  On physical 

exam has edema noted to legs. lungs CTA. sodium is 127.  gave 1 liter of 

fluids. sodium is now 129. do not want to give more fluid as has chf. 129 is 

patient baseline sodium. urine shows potential uti. will treat for uti with 

augmentin.  told follow up with primary.  patient understands and agrees with 

the plan.





- Diagnoses


Differential Diagnoses/HQI/PQRI: Metabolic Abnormality, Sepsis, Urinary Tract 

Infection


Provider Diagnoses: 


 Hyponatremia, UTI (urinary tract infection)








Discharge ED





- Sign-Out/Discharge


Documenting (check all that apply): Patient Departure





- Discharge Plan


Condition: Good


Disposition: HOME


Prescriptions: 


Amoxicillin/Clavulanate TAB* [Augmentin  mg*] 500 mg PO BID #9 tab


Patient Education Materials:  Urinary Tract Infection in Men (ED), Hyponatremia 

(ED)


Referrals: 


Jen Bateman MD [Primary Care Provider] - 


Additional Instructions: 


Take augmentin twice a day for 5 days


Follow up with primary within 5 days


Return to ED if develop any new or worsening symptoms





- Billing Disposition and Condition


Condition: GOOD


Disposition: Home

## 2020-02-18 NOTE — XMS REPORT
Patient Summary Document

 Created on:2020



Patient:[facility] Unknown

Sex:Male

:1940

External Reference #:[id number]





Demographics







 Address  [facility] [place] [address]

 

 Home Phone  [phone]

 

 Preferred Language  English

 

 Marital Status  Unknown

 

 Confucianist Affiliation  Unknown

 

 Race  Unknown

 

 Ethnic Group  Unknown









Author







 Organization  [facility]









Support







 Name  Relationship  Address  Phone

 

 ZI, Unknown Name  NextOfBernardino  Unknown Address  [phone]









Care Team Providers







 Name  Role  Phone

 

 Unavailable  Unavailable  Unavailable









Problems







 Condition  Condition  Condition  Status  Onset  Resolution  Last  Treating  
Comments



 Name  Details  Category    Date  Date  Treatment  Clinician  



             Date    

 

 Hypertensiv  Hypertensiv  Diagnosis  Active        Elmer  



 e heart  e heart            Smith  



 disease  disease            [id number]  



 with heart  with heart              



 failure  failure              

 

 Chronic  Chronic  Diagnosis  Active        Elmer  



 systolic  systolic            [place]  



 (congestive  (congestive            [id number]  



 ) heart  ) heart              



 failure  failure              

 

 Paroxysmal  Paroxysmal  Diagnosis  Active        Elmer  



 atrial  atrial            [place]  



 fibrillatio  fibrillatio            [id number]  



 n  n              

 

 Type 2  Type 2  Diagnosis  Active        Elmer  



 diabetes  diabetes            Smith  



 mellitus  mellitus            [id number]  



 without  without              



 complicatio  complicatio              



 ns  ns              

 

 Diverticuli  Diverticuli  Diagnosis  Active        Elmer  



 tis of  tis of            Smith  



 intestine,  intestine,            [id number]  



 part  part              



 unspecified  unspecified              



 , without  , without              



 perforation  perforation              



 or abscess  or abscess              



 without  without              



 bleeding  bleeding              

 

 Anemia,  Anemia,  Diagnosis  Active        Elmer  



 unspecified  unspecified            [place]  



               [id number]  

 

 Benign  Benign  Diagnosis  Active        Elmer  



 prostatic  prostatic            Smith  



 hyperplasia  hyperplasia            [id number]  



 without  without              



 lower  lower              



 urinary  urinary              



 tract  tract              



 symptoms  symptoms              

 

 Lymphocytop  Lymphocytop  Diagnosis  Active        Elmer  



 enia  enia            [place]  



               [id number]  

 

 Encounter  Encounter  Diagnosis  Active        Elmer  



 for  alka [place]  



 attention  attention            [id number]  



 to  to              



 colostomy  colostomy              

 

 Long term  Long term  Diagnosis  Active        Elmer  



 (current)  (current)            [place]  



 use of  use of            [id number]  



 anticoagula  anticoagula              



 nts  nts              

 

 Personal  Personal  Diagnosis  Active        Elmer  



 history of  history of            [place]  



 transient  transient            [id number]  



 ischemic  ischemic              



 attack  attack              



 (TIA), and  (TIA), and              



 cerebral  cerebral              



 infarction  infarction              



 without  without              



 residual  residual              



 deficits  deficits              

 

 Presence of  Presence of  Diagnosis  Active        Elmer  



 cardiac  cardiac            [place]  



 pacemaker  pacemaker            [id number]  

 

 Presence of  Presence of  Diagnosis  Active        Elmer  



 prosthetic  prosthetic            [facility]  



 heart valve  heart valve            [id number]  

 

 Personal  Personal  Diagnosis  Active        Elmer  



 history of  history of            [facility]  



 nicotine  nicotine            [id number]  



 dependence  dependence              

 

 Problems  Problems  Diagnosis  Active        El[facility]  



 related to  related to            [place]  



 living  living            [id number]  



 alone  alone              

 

 Pain  frequent  Pain Mgmt  Resolve  2019    Patricia  



   pain    d    10:55:00    [place]  



         10:30:      [id number]  



         00        

 

 Cardio  edema  Cardiovasc  Active  2019      Patricia  



     ular          [place]  



         10:30:      [id number]  



         00        

 

 Cardio  knowledge/s  Cardiovasc  Active  2019      Patricia  



   kill  ular          [place]  



   deficit: pt      10:30:      [id number]  



         00        

 

 Respiratory  dyspnea  Respirator  Resolve  2019    Patricia  



   present  y  d    10:45:00    [place]  



         10:30:      [id number]  



         00        

 

 Endo/Hema  newly  Endo/Hema  Resolve  2019    Patricia  



   diagnosed    d    09:45:00    [place]  



   diabetes      10:30:      [id number]  



         00        

 

 Endo/Hema  diabetic  Endo/Hema  Resolve  2019    Patricia  



   foot care    d    09:45:00    [place]  



         10:30:      [id number]  



         00        

 

 Endo/Hema  anti-coagul  Endo/Hema  Resolve  2019    Patricia  



   ation    d    09:45:00    [place]  



   therapy      10:30:      [id number]  



         00        

 

 Sensory  impaired  Sensory  Resolve  2019    Patricia  



   hearing    d    10:45:00    [place]  



         10:30:      [id number]  



         00        

 

 Integument  skin  Integument  Resolve  2019    Patricia  



   integrity    d    11:40:00    [place]  



   risk      10:30:      [id number]  



         00        

 

 Nutrition  nutritional  Nutrition  Resolve  2019    Patricia  



   restriction    d    11:40:00    [place]  



   s      10:30:      [id number]  



         00        

 

 Elimination  ostomy  Eliminatio  Resolve  2019    Patricia  



   present  n  d    11:40:00    [place]  



         10:30:      [id number]  



         00        

 

 Neuro  confusion  Neuro/Emot  Resolve  2019    Patricia  



   present  ion  d    10:45:00    [place]  



         10:30:      [id number]  



         00        

 

 Neuro  impaired  Neuro/Emot  Resolve  2019    Patricia  



   decision-ma  ion  d    10:45:00    [place]  



   felicia      10:30:      [id number]  



         00        

 

 Activity  ADL  Activity  Resolve  2019    Patricia  



   assistance    d    10:45:00    [place]  



   required      10:30:      [id number]  



         00        

 

 Activity  self-care  Activity  Resolve  2019    [facility]  



   deficit    d    10:45:00    [place]  



         10:30:      [id number]  



         00        

 

 Safety  structural  Safety  Resolve  2019    Patricia  



   barriers    d    10:55:00    [place]  



   present      10:30:      [id number]  



         00        

 

 Safety  fall risk  Safety  Resolve  2019    [facility]ricia  



   factor    d    10:55:00    [place]  



   present      10:30:      [id number]  



         00        

 

 Safety  risk for  Safety  Resolve  2019    Patricia  



   hospitaliza    d    10:55:00    [place]  



   tion      10:30:      [id number]  



         00        

 

 Safety  can be left  Safety  Active  2019      Patricia  



   alone for            [place]  



   only short      10:30:      [id number]  



   periods      00        

 

 Safety  safety  Safety  Resolve  2019    [facility]  



   hazards    d    10:55:00    REALTIME  



   present      10:30:        



         00        

 

 Medication  oral med  Meds  Resolve  2019    [facility]  



   assistance    d    10:55:00    [place]  



   required      10:30:      [id number]  



         00        

 

 Medication  knowledge/s  Meds  Resolve  2019    [facility]  



   kill    d    10:55:00    [place]  



   deficit: pt      10:30:      [id number]  



         00        

 

 Musculoskel  transfer  Musculoske  Resolve  2019    [facility]ricia  



 etal  assistance  letal  d    10:45:00    [place]  



   required      10:30:      [id number]  



         00        

 

 Musculoskel  requires  Musculoske  Resolve  2019    [facility]ricia  



 etal  human  letal  d    10:45:00    [place]  



   assist to      10:30:      [id number]  



   leave home      00        

 

 Cath/Ostomy  k/s  Cath\Ostom  Resolve  2019    Patricia  



 /GI  deficit:  y\GI Care  d    11:40:00    [place]  



   cath/ost/GI      10:30:      [id number]  



   care - pt      00        

 

 Balance/End  balance/  PT/OT:  Resolve  2019    Shyam  



 urance  rdination  Balance/En  d    10:45:00    [place]  



   deficit  durance    12:07:      [id number]  



         00        

 

 OT: Self  self-care  OT:  Resolve  2019    Shyam  



 Care  deficit  Self-Care  d    10:45:00    [place]  



         12:07:      [id number]  



         00        

 

 Gait/Locomo  stair  PT/OT:  Resolve  2019    Shyam  



 tion  management  Gait/Locom  d    10:45:00    [place]  



 problems  req  otion    12:07:      [id number]  



         00        

 

 Gait/Locomo  gait  PT/OT:  Resolve  2019    Shyam  



 tion  deficit  Gait/Locom  d    10:45:00    [place]  



 problems    otion    12:07:      [id number]  



         00        

 

 Elimination  ostomy  Eliminatio  Resolve  2019    [facility]  



   present  n  d  1-15  10:55:00    [place]  



         09:55:      [id number]  



         00        

 

 Elimination  urinary  Eliminatio  Resolve  2019    Elmer  



   incontinenc  n  d    10:45:00    [place] gill      10:45:      [id number]  



         00        

 

 Elimination  ostomy  Eliminatio  Resolve  2019    [facility]mer  



   present  n  d  -  10:45:00    [place]  



         10:45:      [id number]  



         00        

 

 Safety  risk for  Safety  Active  2019      [facility]  



   hospitaliza            [place]  



   tion      10:50:      [id number]  



         00        

 

 Activity  ADL  Activity  Unknown  2019      [place]  



   assistance            [place]  



   required      10:45:      [id number]  



         00        

 

 Safety  fall risk  Safety  Active  2019      [place]  



   factor            [place]  



   present      10:45:      [id number]  



         00        

 

 Sensory  impaired  Sensory  Active        [place]  



   hearing            [place]  



         11:30:      [id number]  



         00        

 

 Elimination  urinary  Eliminatio  Resolve  2020    [place]  



   incontinenc  n  d    10:55:00    [place]  



   e      11:30:      [id number]  



         00        

 

 Elimination  urinary  Eliminatio  Resolve  2020    [place]  



   urgency  n  d    10:55:00    [place]  



         11:30:      [id number]  



         00        

 

 Elimination  ostomy  Eliminatio  Active        [place]  



   present  n          [facility]  



         11:30:      [id number]  



         00        

 

 Neuro  impaired  Neuro/Emot  Active        [place]  



   decision-ma  ion          [facility]  



   felicia      11:30:      [id number]  



         00        

 

 Safety  structural  Safety  Active        [place]  



   barriers            [facility]  



   present      11:30:      [id number]  



         00        

 

 Safety  sanitation  Safety  Active        [place]  



   hazards            [facility]  



   present      11:30:      [id number]  



         00        

 

 Musculoskel  requires  Musculoske  Active        [place]  



 etal  human  letal          [facility] [place]  



   assist to      11:30:      [id number]  



   leave home      00        







Allergies, Adverse Reactions, Alerts







 Allergy  Allergy  Status  Severity  Reaction(s)  Onset  Inactive  Treating  
Comments



 Name  Type        Date  Date  Clinician  

 

 Unknown  None  Active  Unknown  None      Unknown  No Known



                 Allergies



                 For This



                 Patient







Medications







 Ordered  Filled  Start  Stop  Current  Ordering  Indication  Dosage  Frequency
  Signature  Comments  Components



 Medication  Medication  Date  Date  Medication?  Clinician        (SIG)    



 Name  Name                    

 

 Eliquis 2.5  Eliquis 2.5  2019    No  [place]    Unknown  Unknown      



 mg tablet  mg tablet        Jen JEFF            

 

 metoprolol  metoprolol  2019    No  [facility]    Unknown  Unknown      



 tartrate  tartrate        Jen JEFF            



 100 mg  100 mg                    



 tablet  tablet                    

 

 Cardizem CD  Cardizem CD  2019    No  [place]    Unknown  Unknown      



 120 mg  120 mg        Jen JEFF            



 capsule,ext  capsule,ext                    



 ended  ended                    



 release  release                    

 

 multivitami  multivitami  2019    No  [place]    Unknown  Unknown      



 n with  n with        Jen JEFF            



 minerals  minerals                    



 tablet  tablet                    

 

 Co Q-10 150  Co Q-10 150  2019    No  [place]    Unknown  Unknown      



 mg capsule  mg capsule        Jen JEFF            

 

 niacin ER  niacin ER  2019    No  [place]    Unknown  Unknown      



 250 mg  250 mg  -      Jen JEFF            



 tablet,exte  tablet,exte                    



 nded  nded                    



 release  release                    

 

 iron 27mg  iron 27mg  2019    No  [place]    Unknown  Unknown      



     -      Jen JEFF            

 

 pravastatin  pravastatin  2019    Yes  [place]    Unknown  Unknown      



 40 mg  40 mg  1-15      Jen JEFF            



 tablet  tablet                    







Vital Signs







 Vital Name  Observation Time  Observation Value  Comments

 

 SYSTOLIC mm[Hg]  2020 18:10:13  132 mm[Hg] mm[Hg]  Method: Sit

 

 SYSTOLIC mm[Hg]  2019 18:08:41  128 mm[Hg] mm[Hg]  Method: Stand

 

 DIASTOLIC mm[Hg]  2020 18:10:13  72 mm[Hg] mm[Hg]  Method: Sit

 

 DIASTOLIC mm[Hg]  2019 18:08:41  70 mm[Hg] mm[Hg]  Method: Stand

 

 PULSE  2020 18:10:13  73 /min /min  

 

 RESP RATE  2020 18:10:13  16 /min /min  

 

 TEMP  2020 18:10:13  97.1 [degF]  







Procedures

This patient has no known procedures.



Results

This patient has no known results.

## 2020-02-18 NOTE — XMS REPORT
Patient Summary Document

 Created on:2020



Patient:PARISH HOUSE Unknown

Sex:Male

:1940

External Reference #:[id number]





Demographics







 Address  [place] [address]

 

 Home Phone  [phone]

 

 Preferred Language  English

 

 Marital Status  Unknown

 

 Adventism Affiliation  Unknown

 

 Race  Unknown

 

 Ethnic Group  Unknown









Author







 Organization  [facility]









Support







 Name  Relationship  Address  Phone

 

 ZI, Unknown Name  NextOfBernardino  Unknown Address  [phone]









Care Team Providers







 Name  Role  Phone

 

 Unavailable  Unavailable  Unavailable









Problems







 Condition  Condition  Condition  Status  Onset  Resolution  Last  Treating  
Comments



 Name  Details  Category    Date  Date  Treatment  Clinician  



             Date    

 

 Hypertensiv  Hypertensiv  Diagnosis  Active        Elmer  



 e heart  e heart            Smith  



 disease  disease            [id number]  



 with heart  with heart              



 failure  failure              

 

 Chronic  Chronic  Diagnosis  Active        Elmer  



 systolic  systolic            [place]  



 (congestive  (congestive            [id number]  



 ) heart  ) heart              



 failure  failure              

 

 Paroxysmal  Paroxysmal  Diagnosis  Active        Elmer  



 atrial  atrial            [place]  



 fibrillatio  fibrillatio            [id number]  



 n  n              

 

 Type 2  Type 2  Diagnosis  Active        Elmer  



 diabetes  diabetes            Smith  



 mellitus  mellitus            [id number]  



 without  without              



 complicatio  complicatio              



 ns  ns              

 

 Diverticuli  Diverticuli  Diagnosis  Active        Elmer  



 tis of  tis of            Smith  



 intestine,  intestine,            [id number]  



 part  part              



 unspecified  unspecified              



 , without  , without              



 perforation  perforation              



 or abscess  or abscess              



 without  without              



 bleeding  bleeding              

 

 Anemia,  Anemia,  Diagnosis  Active        Elmer  



 unspecified  unspecified            [place]  



               [id number]  

 

 Benign  Benign  Diagnosis  Active        Elmer  



 prostatic  prostatic            Smith  



 hyperplasia  hyperplasia            [id number]  



 without  without              



 lower  lower              



 urinary  urinary              



 tract  tract              



 symptoms  symptoms              

 

 Lymphocytop  Lymphocytop  Diagnosis  Active        Elmer  



 enia  enia            [place]  



               [id number]  

 

 Encounter  Encounter  Diagnosis  Active        Elmer  



 for  alka Maloney  



 attention  attention            [id number]  



 to  to              



 colostomy  colostomy              

 

 Long term  Long term  Diagnosis  Active        Elmer  



 (current)  (current)            [place]  



 use of  use of            [id number]  



 anticoagula  anticoagula              



 nts  nts              

 

 Personal  Personal  Diagnosis  Active        Elmer  



 history of  history of            [place]  



 transient  transient            [id number]  



 ischemic  ischemic              



 attack  attack              



 (TIA), and  (TIA), and              



 cerebral  cerebral              



 infarction  infarction              



 without  without              



 residual  residual              



 deficits  deficits              

 

 Presence of  Presence of  Diagnosis  Active        Elmer  



 cardiac  cardiac            [place]  



 pacemaker  pacemaker            [id number]  

 

 Presence of  Presence of  Diagnosis  Active        Elmer  



 prosthetic  prosthetic            Smith  



 heart valve  heart valve            [id number]  

 

 Personal  Personal  Diagnosis  Active        Elmer  



 history of  history of            Smith  



 nicotine  nicotine            [id number]  



 dependence  dependence              

 

 Problems  Problems  Diagnosis  Active        Elmer  



 related to  related to            [place]  



 living  living            [id number]  



 alone  alone              

 

 Pain  frequent  Pain Mgmt  Resolve  2019    Patricia  



   pain    d    10:55:00    [place]  



         10:30:      [id number]  



         00        

 

 Cardio  edema  Cardiovasc  Active  2019      Patricia  



     ular          [place]  



         10:30:      [id number]  



         00        

 

 Cardio  knowledge/s  Cardiovasc  Active  2019      Patricia  



   kill  ular          [place]  



   deficit: pt      10:30:      [id number]  



         00        

 

 Respiratory  dyspnea  Respirator  Resolve  2019    Patricia  



   present  y  d    10:45:00    [place]  



         10:30:      [id number]  



         00        

 

 Endo/Hema  newly  Endo/Hema  Resolve  2019    Patricia  



   diagnosed    d    09:45:00    [place]  



   diabetes      10:30:      [id number]  



         00        

 

 Endo/Hema  diabetic  Endo/Hema  Resolve  2019    Patricia  



   foot care    d    09:45:00    [place]  



         10:30:      [id number]  



         00        

 

 Endo/Hema  anti-coagul  Endo/Hema  Resolve  2019    Patricia  



   ation    d    09:45:00    [place]  



   therapy      10:30:      [id number]  



         00        

 

 Sensory  impaired  Sensory  Resolve  2019    Patricia  



   hearing    d    10:45:00    [place]  



         10:30:      [id number]  



         00        

 

 Integument  skin  Integument  Resolve  2019    Patricia  



   integrity    d    11:40:00    [place]  



   risk      10:30:      [id number]  



         00        

 

 Nutrition  nutritional  Nutrition  Resolve  2019    Patricia  



   restriction    d    11:40:00    [place]  



   s      10:30:      [id number]  



         00        

 

 Elimination  ostomy  Eliminatio  Resolve  2019    Patricia  



   present  n  d    11:40:00    [place]  



         10:30:      [id number]  



         00        

 

 Neuro  confusion  Neuro/Emot  Resolve  2019    Patricia  



   present  ion  d    10:45:00    [place]  



         10:30:      [id number]  



         00        

 

 Neuro  impaired  Neuro/Emot  Resolve  2019    Patricia  



   decision-ma  ion  d    10:45:00    [place]  



   felicia      10:30:      [id number]  



         00        

 

 Activity  ADL  Activity  Resolve  2019    Patricia  



   assistance    d    10:45:00    [place]  



   required      10:30:      [id number]  



         00        

 

 Activity  self-care  Activity  Resolve  2019    Patricia  



   deficit    d    10:45:00    [place]  



         10:30:      [id number]  



         00        

 

 Safety  structural  Safety  Resolve  2019    Patricia  



   barriers    d    10:55:00    [place]  



   present      10:30:      [id number]  



         00        

 

 Safety  fall risk  Safety  Resolve  2019    Patricia  



   factor    d    10:55:00    [place]  



   present      10:30:      [id number]  



         00        

 

 Safety  risk for  Safety  Resolve  2019    Patricia  



   hospitaliza    d    10:55:00    [place]  



   tion      10:30:      [id number]  



         00        

 

 Safety  can be left  Safety  Active  2019      Patricia  



   alone for            [place]  



   only short      10:30:      [id number]  



   periods      00        

 

 Safety  safety  Safety  Resolve  2019    QUALITY  



   hazards    d    10:55:00    REALTIME  



   present      10:30:        



         00        

 

 Medication  oral med  Meds  Resolve  2019    Patricia  



   assistance    d    10:55:00    [place]  



   required      10:30:      [id number]  



         00        

 

 Medication  knowledge/s  Meds  Resolve  2019    Patricia  



   kill    d    10:55:00    [place]  



   deficit: pt      10:30:      [id number]  



         00        

 

 Musculoskel  transfer  Musculoske  Resolve  2019    Patricia  



 etal  assistance  letal  d    10:45:00    [place]  



   required      10:30:      [id number]  



         00        

 

 Musculoskel  requires  Musculoske  Resolve  2019    Patricia  



 etal  human  letal  d    10:45:00    [place]  



   assist to      10:30:      [id number]  



   leave home      00        

 

 Cath/Ostomy  k/s  Cath\Ostom  Resolve  2019    Patricia  



 /GI  deficit:  y\GI Care  d    11:40:00    [place]  



   cath/ost/GI      10:30:      [id number]  



   care - pt      00        

 

 Balance/End  balance/  PT/OT:  Resolve  2019    Shyam  



 urance  rdination  Balance/En  d    10:45:00    Mary Anneewicz  



   deficit  durance    12:07:      [id number]  



         00        

 

 OT: Self  self-care  OT:  Resolve  2019    Shyam  



 Care  deficit  Self-Care  d    10:45:00    Kobziewicz  



         12:07:      [id number]  



         00        

 

 Gait/Locomo  stair  PT/OT:  Resolve  2019    Shyam  



 tion  management  Gait/Locom  d    10:45:00    Kobziewicz  



 problems  req  otion    12:07:      [id number]  



         00        

 

 Gait/Locomo  gait  PT/OT:  Resolve  2019    Shyam  



 tion  deficit  Gait/Locom  d    10:45:00    Kobziewicz  



 problems    otion    12:07:      [id number]  



         00        

 

 Elimination  ostomy  Eliminatio  Resolve  2019    Elmer  



   present  n  d  1-15  10:55:00    [place]  



         09:55:      [id number]  



         00        

 

 Elimination  urinary  Eliminatio  Resolve  2019    Elmer  



   incontinenc  n  d  2-  10:45:00    [place] gill      10:45:      [id number]  



         00        

 

 Elimination  ostomy  Eliminatio  Resolve  2019    Elmer  



   present  n  d  2-11  10:45:00    [place]  



         10:45:      [id number]  



         00        

 

 Safety  risk for  Safety  Active  2019      Elmer  



   hospitaliza            [place]  



   tion      10:50:      [id number]  



         00        

 

 Activity  ADL  Activity  Unknown  2019      Elmer  



   assistance            [place]  



   required      10:45:      [id number]  



         00        

 

 Safety  fall risk  Safety  Active  2019      Elmer  



   factor            [place]oney  



   present      10:45:      [id number]  



         00        

 

 Sensory  impaired  Sensory  Active  -      [place]  



   hearing            [place]  



         11:30:      [id number]  



         00        

 

 Elimination  urinary  Eliminatio  Active  -0      Cherrise  



   incontinenc  n          [place]  



   e      11:30:      [id number]  



         00        

 

 Elimination  urinary  Eliminatio  Active        Cherrise  



   urgency  n          [place]  



         11:30:      [id number]  



         00        

 

 Elimination  ostomy  Eliminatio  Active  -0      [place]  



   present  n          [place]  



         11:30:      [id number]  



         00        

 

 Neuro  impaired  Neuro/Emot  Active        [place]  



   decision-ma  ion          [place]  



   felicia      11:30:      [id number]  



         00        

 

 Safety  structural  Safety  Active        Cherrise  



   barriers            [place]  



   present      11:30:      [id number]  



         00        

 

 Safety  sanitation  Safety  Active        Cherrise  



   hazards            [place]  



   present      11:30:      [id number]  



         00        

 

 Musculoskel  requires  Musculoske  Active        Cherrise  



 etal  human  letal          [place]  



   assist to      11:30:      [id number]  



   leave home      00        







Allergies, Adverse Reactions, Alerts







 Allergy  Allergy  Status  Severity  Reaction(s)  Onset  Inactive  Treating  
Comments



 Name  Type        Date  Date  Clinician  

 

 Unknown  None  Active  Unknown  None      Unknown  No Known



                 Allergies



                 For This



                 Patient







Medications







 Ordered  Filled  Start  Stop  Current  Ordering  Indication  Dosage  Frequency
  Signature  Comments  Components



 Medication  Medication  Date  Date  Medication?  Clinician        (SIG)    



 Name  Name                    

 

 Eliquis 2.5  Eliquis 2.5  2019    Yes  [place]    Unknown  Unknown      



 mg tablet  mg tablet  1-04      Jen JEFF            

 

 metoprolol  metoprolol  2019    Yes  [place]    Unknown  Unknown      



 tartrate  tartrate  1-04      Jen JEFF            



 100 mg  100 mg                    



 tablet  tablet                    

 

 Cardizem CD  Cardizem CD  2019    Yes  [place]    Unknown  Unknown      



 120 mg  120 mg  1-04      Jen JEFF            



 capsule,ext  capsule,ext                    



 ended  ended                    



 release  release                    

 

 multivitami  multivitami  2019    Yes  [place]    Unknown  Unknown      



 n with  n with  -      Jen JEFF            



 minerals  minerals                    



 tablet  tablet                    

 

 Co Q-10 150  Co Q-10 150  2019    Yes  [place]    Unknown  Unknown      



 mg capsule  mg capsule  1-04      Jen JEFF            

 

 niacin ER  niacin ER  2019    Yes  [place]    Unknown  Unknown      



 250 mg  250 mg  1-04      Jen JEFF            



 tablet,exte  tablet,exte                    



 nded  nded                    



 release  release                    

 

 iron 27mg  iron 27mg  2019    Yes  [place]    Unknown  Unknown      



     1-04      Jen JEFF            

 

 pravastatin  pravastatin  2019    Yes  [place]    Unknown  Unknown      



 40 mg  40 mg  -15      Jen JEFF            



 tablet  tablet                    







Vital Signs







 Vital Name  Observation Time  Observation Value  Comments

 

 SYSTOLIC mm[Hg]  2020 18:09:59  132 mm[Hg] mm[Hg]  Method: Sit

 

 SYSTOLIC mm[Hg]  2019 18:08:41  128 mm[Hg] mm[Hg]  Method: Stand

 

 DIASTOLIC mm[Hg]  2020 18:09:59  64 mm[Hg] mm[Hg]  Method: Sit

 

 DIASTOLIC mm[Hg]  2019 18:08:41  70 mm[Hg] mm[Hg]  Method: Stand

 

 PULSE  2020 18:09:59  70 /min /min  

 

 RESP RATE  2020 18:09:59  16 /min /min  

 

 TEMP  2020 18:09:59  99.7 [degF]  







Procedures

This patient has no known procedures.



Results

This patient has no known results.

## 2020-02-18 NOTE — XMS REPORT
Patient Summary Document

 Created on:2020



Patient:PAR[facility] HOUSE Unknown

Sex:Male

:1940

External Reference #:[id number]





Demographics







 Address  [facility] [place] [address]

 

 Home Phone  [phone]

 

 Preferred Language  English

 

 Marital Status  Unknown

 

 [employer]ism Affiliation  Unknown

 

 Race  Unknown

 

 Ethnic Group  Unknown









Author







 Organization  [facility]









Support







 Name  Relationship  Address  Phone

 

 ZI, Unknown Name  NextOfBernardino  Unknown Address  [phone]









Care Team Providers







 Name  Role  Phone

 

 Unavailable  Unavailable  Unavailable









Problems







 Condition  Condition  Condition  Status  Onset  Resolution  Last  Treating  
Comments



 Name  Details  Category    Date  Date  Treatment  Clinician  



             Date    

 

 Hypertensiv  Hypertensiv  Diagnosis  Active        Elmer  



 e heart  e heart            Smith  



 disease  disease            [id number]  



 with heart  with heart              



 failure  failure              

 

 Chronic  Chronic  Diagnosis  Active        Elmer  



 systolic  systolic            [place]  



 (congestive  (congestive            [id number]  



 ) heart  ) heart              



 failure  failure              

 

 Paroxysmal  Paroxysmal  Diagnosis  Active        Elmer  



 atrial  atrial            [place]  



 fibrillatio  fibrillatio            [id number]  



 n  n              

 

 Type 2  Type 2  Diagnosis  Active        Elmer  



 diabetes  diabetes            Smith  



 mellitus  mellitus            [id number]  



 without  without              



 complicatio  complicatio              



 ns  ns              

 

 Diverticuli  Diverticuli  Diagnosis  Active        Elmer  



 tis of  tis of            Smith  



 intestine,  intestine,            [id number]  



 part  part              



 unspecified  unspecified              



 , without  , without              



 perforation  perforation              



 or abscess  or abscess              



 without  without              



 bleeding  bleeding              

 

 Anemia,  Anemia,  Diagnosis  Active        Elmer  



 unspecified  unspecified            [place]  



               [id number]  

 

 Benign  Benign  Diagnosis  Active        Elmer  



 prostatic  prostatic            Smith  



 hyperplasia  hyperplasia            [id number]  



 without  without              



 lower  lower              



 urinary  urinary              



 tract  tract              



 symptoms  symptoms              

 

 Lymphocytop  Lymphocytop  Diagnosis  Active        Elmer  



 enia  enia            [place]  



               [id number]  

 

 Encounter  Encounter  Diagnosis  Active        Elmer  



 for  alka Maloney  



 attention  attention            [id number]  



 to  to              



 colostomy  colostomy              

 

 Long term  Long term  Diagnosis  Active        Elmer  



 (current)  (current)            [place]  



 use of  use of            [id number]  



 anticoagula  anticoagula              



 nts  nts              

 

 Personal  Personal  Diagnosis  Active        Elmer  



 history of  history of            [place]  



 transient  transient            [id number]  



 ischemic  ischemic              



 attack  attack              



 (TIA), and  (TIA), and              



 cerebral  cerebral              



 infarction  infarction              



 without  without              



 residual  residual              



 deficits  deficits              

 

 Presence of  Presence of  Diagnosis  Active        Elmer  



 cardiac  cardiac            [place]  



 pacemaker  pacemaker            [id number]  

 

 Presence of  Presence of  Diagnosis  Active        Elmer  



 prosthetic  prosthetic            Smith  



 heart valve  heart valve            [id number]  

 

 Personal  Personal  Diagnosis  Active        Elmer  



 history of  history of            Smith  



 nicotine  nicotine            [id number]  



 dependence  dependence              

 

 Problems  Problems  Diagnosis  Active        Elmer  



 related to  related to            [place]  



 living  living            [id number]  



 alone  alone              

 

 Pain  frequent  Pain Mgmt  Resolve  2019    Patricia  



   pain    d    10:55:00    [place]  



         10:30:      [id number]  



         00        

 

 Cardio  edema  Cardiovasc  Active  2019      Patricia  



     ular          [place]  



         10:30:      [id number]  



         00        

 

 Cardio  knowledge/s  Cardiovasc  Active  2019      Patricia  



   kill  ular          [place]  



   deficit: pt      10:30:      [id number]  



         00        

 

 Respiratory  dyspnea  Respirator  Resolve  2019    Patricia  



   present  y  d    10:45:00    [place]  



         10:30:      [id number]  



         00        

 

 Endo/Hema  newly  Endo/Hema  Resolve  2019    Patricia  



   diagnosed    d    09:45:00    [place]  



   diabetes      10:30:      [id number]  



         00        

 

 Endo/Hema  diabetic  Endo/Hema  Resolve  2019    Patricia  



   foot care    d    09:45:00    [place]  



         10:30:      [id number]  



         00        

 

 Endo/Hema  anti-coagul  Endo/Hema  Resolve  2019    Patricia  



   ation    d    09:45:00    [place]  



   therapy      10:30:      [id number]  



         00        

 

 Sensory  impaired  Sensory  Resolve  2019    Patricia  



   hearing    d    10:45:00    [place]  



         10:30:      [id number]  



         00        

 

 Integument  skin  Integument  Resolve  2019    Patricia  



   integrity    d    11:40:00    [place]  



   risk      10:30:      [id number]  



         00        

 

 Nutrition  nutritional  Nutrition  Resolve  2019    Patricia  



   restriction    d    11:40:00    [place]  



   s      10:30:      [id number]  



         00        

 

 Elimination  ostomy  Eliminatio  Resolve  2019    Patricia  



   present  n  d    11:40:00    [place]  



         10:30:      [id number]  



         00        

 

 Neuro  confusion  Neuro/Emot  Resolve  2019    Patricia  



   present  ion  d    10:45:00    [place]  



         10:30:      [id number]  



         00        

 

 Neuro  impaired  Neuro/Emot  Resolve  2019    Patricia  



   decision-ma  ion  d    10:45:00    [place]  



   felicia      10:30:      [id number]  



         00        

 

 Activity  ADL  Activity  Resolve  2019    Patricia  



   assistance    d    10:45:00    [place]  



   required      10:30:      [id number]  



         00        

 

 Activity  self-care  Activity  Resolve  2019    Patricia  



   deficit    d    10:45:00    [place]  



         10:30:      [id number]  



         00        

 

 Safety  structural  Safety  Resolve  2019    Patricia  



   barriers    d    10:55:00    [place]  



   present      10:30:      [id number]  



         00        

 

 Safety  fall risk  Safety  Resolve  2019    Patricia  



   factor    d    10:55:00    [place]  



   present      10:30:      [id number]  



         00        

 

 Safety  risk for  Safety  Resolve  2019    Patricia  



   hospitaliza    d    10:55:00    [place]  



   tion      10:30:      [id number]  



         00        

 

 Safety  can be left  Safety  Active  2019      Patricia  



   alone for            [place]  



   only short      10:30:      [id number]  



   periods      00        

 

 Safety  safety  Safety  Resolve  2019    QUALITY  



   hazards    d    10:55:00    REALTIME  



   present      10:30:        



         00        

 

 Medication  oral med  Meds  Resolve  2019    Patricia  



   assistance    d    10:55:00    [place]  



   required      10:30:      [id number]  



         00        

 

 Medication  knowledge/s  Meds  Resolve  2019    Patricia  



   kill    d    10:55:00    [place]  



   deficit: pt      10:30:      [id number]  



         00        

 

 Musculoskel  transfer  Musculoske  Resolve  2019    Patricia  



 etal  assistance  letal  d    10:45:00    [place]  



   required      10:30:      [id number]  



         00        

 

 Musculoskel  requires  Musculoske  Resolve  2019    Patricia  



 etal  human  letal  d    10:45:00    [place]  



   assist to      10:30:      [id number]  



   leave home      00        

 

 Cath/Ostomy  k/s  Cath\Ostom  Resolve  2019    Patricia  



 /GI  deficit:  y\GI Care  d    11:40:00    [place]  



   cath/ost/GI      10:30:      [id number]  



   care - pt      00        

 

 Balance/End  balance/  PT/OT:  Resolve  2019    Shaym  



 urance  rdination  Balance/En  d    10:45:00    Mary Anneewicz  



   deficit  durance    12:07:      [id number]  



         00        

 

 OT: Self  self-care  OT:  Resolve  2019    Shyam  



 Care  deficit  Self-Care  d    10:45:00    [place]  



         12:07:      [id number]  



         00        

 

 Gait/Locomo  stair  PT/OT:  Resolve  2019    Shyam  



 tion  management  Gait/Locom  d    10:45:00    [place]  



 problems  req  otion    12:07:      [id number]  



         00        

 

 Gait/Locomo  gait  PT/OT:  Resolve  2019    Shyam  



 tion  deficit  Gait/Locom  d    10:45:00    [place]  



 problems    otion    12:07:      [id number]  



         00        

 

 Elimination  ostomy  Eliminatio  Resolve  2019    Elmer  



   present  n  d  1-15  10:55:00    [place]  



         09:55:      [id number]  



         00        

 

 Elimination  urinary  Eliminatio  Resolve  2019    Elmer  



   incontinenc  n  d  2-  10:45:00    [place] gill      10:45:      [id number]  



         00        

 

 Elimination  ostomy  Eliminatio  Resolve  2019    Elmer  



   present  n  d  2-11  10:45:00    [place]  



         10:45:      [id number]  



         00        

 

 Safety  risk for  Safety  Active  2019      Elmer  



   hospitaliza            [place]  



   tion      10:50:      [id number]  



         00        

 

 Activity  ADL  Activity  Unknown  2019      Elmer  



   assistance            [place]  



   required      10:45:      [id number]  



         00        

 

 Safety  fall risk  Safety  Active  2019      Elmer  



   factor            [place]  



   present      10:45:      [id number]  



         00        

 

 Sensory  impaired  Sensory  Active  -      [place]  



   hearing            [place]  



         11:30:      [id number]  



         00        

 

 Elimination  urinary  Eliminatio  Active  -0      Cherrise  



   incontinenc  n          [place]  



   e      11:30:      [id number]  



         00        

 

 Elimination  urinary  Eliminatio  Active        [place]  



   urgency  n          [place]  



         11:30:      [id number]  



         00        

 

 Elimination  ostomy  Eliminatio  Active  -0      [place]  



   present  n          [place]  



         11:30:      [id number]  



         00        

 

 Neuro  impaired  Neuro/Emot  Active        Cher[place]  



   decision-ma  ion          [place]  



   felicia      11:30:      [id number]  



         00        

 

 Safety  structural  Safety  Active        [place]  



   barriers            [place]  



   present      11:30:      [id number]  



         00        

 

 Safety  sanitation  Safety  Active        [place]  



   hazards            [place]  



   present      11:30:      [id number]  



         00        

 

 Musculoskel  requires  Musculoske  Active        [place]  



 etal  human  letal          [place]  



   assist to      11:30:      [id number]  



   leave home      00        







Allergies, Adverse Reactions, Alerts







 Allergy  Allergy  Status  Severity  Reaction(s)  Onset  Inactive  Treating  
Comments



 Name  Type        Date  Date  Clinician  

 

 Unknown  None  Active  Unknown  None      Unknown  No Known



                 Allergies



                 For This



                 Patient







Medications







 Ordered  Filled  Start  Stop  Current  Ordering  Indication  Dosage  Frequency
  Signature  Comments  Components



 Medication  Medication  Date  Date  Medication?  Clinician        (SIG)    



 Name  Name                    

 

 Eliquis 2.5  Eliquis 2.5  2019    Yes  [place]    Unknown  Unknown      



 mg tablet  mg tablet  -04      Jen JEFF            

 

 metoprolol  metoprolol  2019    Yes  [place]    Unknown  Unknown      



 tartrate  tartrate  -04      Jen JEFF            



 100 mg  100 mg                    



 tablet  tablet                    

 

 Cardizem CD  Cardizem CD  2019    Yes  [place]    Unknown  Unknown      



 120 mg  120 mg  1-04      Jen JEFF            



 capsule,ext  capsule,ext                    



 ended  ended                    



 release  release                    

 

 multivitami  multivitami  2019    Yes  [place]    Unknown  Unknown      



 n with  n with  -      Jen JEFF            



 minerals  minerals                    



 tablet  tablet                    

 

 Co Q-10 150  Co Q-10 150  2019    Yes  [place]    Unknown  Unknown      



 mg capsule  mg capsule  1-04      Jen JEFF            

 

 niacin ER  niacin ER  2019    Yes  [place]    Unknown  Unknown      



 250 mg  250 mg  1-04      Jen JEFF            



 tablet,exte  tablet,exte                    



 nded  nded                    



 release  release                    

 

 iron 27mg  iron 27mg  2019    Yes  [place]    Unknown  Unknown      



     1-04      Jen JEFF            

 

 pravastatin  pravastatin  2019    Yes  [place]    Unknown  Unknown      



 40 mg  40 mg  -15      Jen JEFF            



 tablet  tablet                    







Vital Signs







 Vital Name  Observation Time  Observation Value  Comments

 

 SYSTOLIC mm[Hg]  2020 18:10:06  118 mm[Hg] mm[Hg]  Method: Sit

 

 SYSTOLIC mm[Hg]  2019 18:08:41  128 mm[Hg] mm[Hg]  Method: Stand

 

 DIASTOLIC mm[Hg]  2020 18:10:06  64 mm[Hg] mm[Hg]  Method: Sit

 

 DIASTOLIC mm[Hg]  2019 18:08:41  70 mm[Hg] mm[Hg]  Method: Stand

 

 PULSE  2020 18:10:06  64 /min /min  

 

 RESP RATE  2020 18:10:06  16 /min /min  

 

 TEMP  2020 18:10:06  96.9 [degF]  







Procedures

This patient has no known procedures.



Results

This patient has no known results.

## 2020-02-18 NOTE — XMS REPORT
Patient Summary Document

 Created on:2020



Patient:PARISH HOSUE Unknown

Sex:Male

:1940

External Reference #:[id number]





Demographics







 Address  [facility] [place] [address]

 

 Home Phone  [phone]

 

 Preferred Language  English

 

 Marital Status  Unknown

 

 Jewish Affiliation  Unknown

 

 Race  Unknown

 

 Ethnic Group  Unknown









Author







 Organization  [facility]









Support







 Name  Relationship  Address  Phone

 

 ZI, Unknown Name  NextOfBernardino  Unknown Address  [phone]









Care Team Providers







 Name  Role  Phone

 

 Unavailable  Unavailable  Unavailable









Problems







 Condition  Condition  Condition  Status  Onset  Resolution  Last  Treating  
Comments



 Name  Details  Category    Date  Date  Treatment  Clinician  



             Date    

 

 Hypertensiv  Hypertensiv  Diagnosis  Active        Elmer  



 e heart  e heart            Smith  



 disease  disease            [id number]  



 with heart  with heart              



 failure  failure              

 

 Chronic  Chronic  Diagnosis  Active        Elmer  



 systolic  systolic            Haider  



 (congestive  (congestive            [id number]  



 ) heart  ) heart              



 failure  failure              

 

 Paroxysmal  Paroxysmal  Diagnosis  Active        Elmer  



 atrial  atrial            Haider  



 fibrillatio  fibrillatio            [id number]  



 n  n              

 

 Type 2  Type 2  Diagnosis  Active        Elmer  



 diabetes  diabetes            Smith  



 mellitus  mellitus            [id number]  



 without  without              



 complicatio  complicatio              



 ns  ns              

 

 Diverticuli  Diverticuli  Diagnosis  Active        Elmer  



 tis of  tis of            Smith  



 intestine,  intestine,            [id number]  



 part  part              



 unspecified  unspecified              



 , without  , without              



 perforation  perforation              



 or abscess  or abscess              



 without  without              



 bleeding  bleeding              

 

 Anemia,  Anemia,  Diagnosis  Active        Elmer  



 unspecified  unspecified            Haider  



               [id number]  

 

 Benign  Benign  Diagnosis  Active        Elmer  



 prostatic  prostatic            Smith  



 hyperplasia  hyperplasia            [id number]  



 without  without              



 lower  lower              



 urinary  urinary              



 tract  tract              



 symptoms  symptoms              

 

 Lymphocytop  Lymphocytop  Diagnosis  Active        Elmer  



 enia  enia            Haider  



               [id number]  

 

 Encounter  Encounter  Diagnosis  Active        Elmer  



 for  alka Maloney  



 attention  attention            [id number]  



 to  to              



 colostomy  colostomy              

 

 Long term  Long term  Diagnosis  Active        Elmer  



 (current)  (current)            Haider  



 use of  use of            [id number]  



 anticoagula  anticoagula              



 nts  nts              

 

 Personal  Personal  Diagnosis  Active        Elmer  



 history of  history of            Haider  



 transient  transient            [id number]  



 ischemic  ischemic              



 attack  attack              



 (TIA), and  (TIA), and              



 cerebral  cerebral              



 infarction  infarction              



 without  without              



 residual  residual              



 deficits  deficits              

 

 Presence of  Presence of  Diagnosis  Active        Elmer  



 cardiac  cardiac            Haider  



 pacemaker  pacemaker            [id number]  

 

 Presence of  Presence of  Diagnosis  Active        Elmer  



 prosthetic  prosthetic            Smith  



 heart valve  heart valve            [id number]  

 

 Personal  Personal  Diagnosis  Active        Elmer  



 history of  history of            Smith  



 nicotine  nicotine            [id number]  



 dependence  dependence              

 

 Problems  Problems  Diagnosis  Active        Elmer  



 related to  related to            Haider  



 living  living            [id number]  



 alone  alone              

 

 Pain  frequent  Pain Mgmt  Resolve  2019    Patricia  



   pain    d    10:55:00    Malden  



         10:30:      [id number]  



         00        

 

 Cardio  edema  Cardiovasc  Active  2019      Patricia  



     ular          Malden  



         10:30:      [id number]  



         00        

 

 Cardio  knowledge/s  Cardiovasc  Active  2019      Patricia  



   kill  ular          Malden  



   deficit: pt      10:30:      [id number]  



         00        

 

 Respiratory  dyspnea  Respirator  Resolve  2019    Patricia  



   present  y  d    10:45:00    Malden  



         10:30:      [id number]  



         00        

 

 Endo/Hema  newly  Endo/Hema  Resolve  2019    Patricia  



   diagnosed    d    09:45:00    Malden  



   diabetes      10:30:      [id number]  



         00        

 

 Endo/Hema  diabetic  Endo/Hema  Resolve  2019    Patricia  



   foot care    d    09:45:00    Malden  



         10:30:      [id number]  



         00        

 

 Endo/Hema  anti-coagul  Endo/Hema  Resolve  2019    Patricia  



   ation    d    09:45:00    Malden  



   therapy      10:30:      [id number]  



         00        

 

 Sensory  impaired  Sensory  Resolve  2019    Patricia  



   hearing    d    10:45:00    Malden  



         10:30:      [id number]  



         00        

 

 Integument  skin  Integument  Resolve  2019    Patricia  



   integrity    d    11:40:00    Malden  



   risk      10:30:      [id number]  



         00        

 

 Nutrition  nutritional  Nutrition  Resolve  2019    Patricia  



   restriction    d    11:40:00    Malden  



   s      10:30:      [id number]  



         00        

 

 Elimination  ostomy  Eliminatio  Resolve  2019    Patricia  



   present  n  d    11:40:00    Malden  



         10:30:      [id number]  



         00        

 

 Neuro  confusion  Neuro/Emot  Resolve  2019    Patricia  



   present  ion  d    10:45:00    Malden  



         10:30:      [id number]  



         00        

 

 Neuro  impaired  Neuro/Emot  Resolve  2019    Patricia  



   decision-ma  ion  d    10:45:00    Malden  



   felicia      10:30:      [id number]  



         00        

 

 Activity  ADL  Activity  Resolve  2019    Patricia  



   assistance    d    10:45:00    Harper  



   required      10:30:      [id number]  



         00        

 

 Activity  self-care  Activity  Resolve  2019    Patricia  



   deficit    d    10:45:00    Harper  



         10:30:      [id number]  



         00        

 

 Safety  structural  Safety  Resolve  2019    Patricia  



   barriers    d    10:55:00    Malden  



   present      10:30:      [id number]  



         00        

 

 Safety  fall risk  Safety  Resolve  2019    Patricia  



   factor    d    10:55:00    Malden  



   present      10:30:      [id number]  



         00        

 

 Safety  risk for  Safety  Resolve  2019    Patricia  



   hospitaliza    d    10:55:00    Malden  



   tion      10:30:      [id number]  



         00        

 

 Safety  can be left  Safety  Active  2019      Patricia  



   alone for            Malden  



   only short      10:30:      [id number]  



   periods      00        

 

 Safety  safety  Safety  Resolve  2019    QUALITY  



   hazards    d    10:55:00    REALTIME  



   present      10:30:        



         00        

 

 Medication  oral med  Meds  Resolve  2019    Patricia  



   assistance    d    10:55:00    Harper  



   required      10:30:      [id number]  



         00        

 

 Medication  knowledge/s  Meds  Resolve  2019    Patricia  



   kill    d    10:55:00    Malden  



   deficit: pt      10:30:      [id number]  



         00        

 

 Musculoskel  transfer  Musculoske  Resolve  2019    Patricia  



 etal  assistance  letal  d    10:45:00    Harper  



   required      10:30:      [id number]  



         00        

 

 Musculoskel  requires  Musculoske  Resolve  2019    Patricia  



 etal  human  letal  d    10:45:00    Harper  



   assist to      10:30:      [id number]  



   leave home      00        

 

 Cath/Ostomy  k/s  Cath\Ostom  Resolve  2019    Patricia  



 /GI  deficit:  y\GI Care  d    11:40:00    Malden  



   cath/ost/GI      10:30:      [id number]  



   care - pt      00        

 

 Balance/End  balance/  PT/OT:  Resolve  2019    Shyam  



 urance  rdination  Balance/En  d    10:45:00    Mary Anneewicz  



   deficit  durance    12:07:      [id number]  



         00        

 

 OT: Self  self-care  OT:  Resolve  2019    Shyam  



 Care  deficit  Self-Care  d    10:45:00    Kobziewicz  



         12:07:      [id number]  



         00        

 

 Gait/Locomo  stair  PT/OT:  Resolve  2019    Shyam  



 tion  management  Gait/Locom  d    10:45:00    Kobziewicz  



 problems  req  otion    12:07:      [id number]  



         00        

 

 Gait/Locomo  gait  PT/OT:  Resolve  2019    Shyam  



 tion  deficit  Gait/Locom  d    10:45:00    Kobziewicz  



 problems    otion    12:07:      [id number]  



         00        

 

 Elimination  ostomy  Eliminatio  Resolve  2019    Elmer  



   present  n  d  1-15  10:55:00    Haider  



         09:55:      [id number]  



         00        

 

 Elimination  urinary  Eliminatio  Resolve  2019    Elmer  



   incontinenc  n  d  2-  10:45:00    Haider gill      10:45:      [id number]  



         00        

 

 Elimination  ostomy  Eliminatio  Resolve  2019    Elmer  



   present  n  d  2-11  10:45:00    Haider  



         10:45:      [id number]  



         00        

 

 Safety  risk for  Safety  Active  2019      Elmer  



   hospitaliza            Haider  



   tion      10:50:      [id number]  



         00        

 

 Activity  ADL  Activity  Unknown  2019      Elmer  



   assistance            Maloney  



   required      10:45:      [id number]  



         00        

 

 Safety  fall risk  Safety  Active  2019      Elmer  



   factor            Maloney  



   present      10:45:      [id number]  



         00        

 

 Sensory  impaired  Sensory  Active  -      Cherrise  



   hearing            Ramona  



         11:30:      [id number]  



         00        

 

 Elimination  urinary  Eliminatio  Active  -0      Cherrise  



   incontinenc  n          Labelle  



   e      11:30:      [id number]  



         00        

 

 Elimination  urinary  Eliminatio  Active        Cherrise  



   urgency  n          Labelle  



         11:30:      [id number]  



         00        

 

 Elimination  ostomy  Eliminatio  Active  -0      Cherrise  



   present  n          Labelle  



         11:30:      [id number]  



         00        

 

 Neuro  impaired  Neuro/Emot  Active        Cherrise  



   decision-ma  ion          Labelle  



   felicia      11:30:      [id number]  



         00        

 

 Safety  structural  Safety  Active        Cherrise  



   barriers            Labelle  



   present      11:30:      [id number]  



         00        

 

 Safety  sanitation  Safety  Active        Cherrise  



   hazards            Labelle  



   present      11:30:      [id number]  



         00        

 

 Musculoskel  requires  Musculoske  Active        Cherrise  



 etal  human  letal          Labelle  



   assist to      11:30:      [id number]  



   leave home      00        







Allergies, Adverse Reactions, Alerts







 Allergy  Allergy  Status  Severity  Reaction(s)  Onset  Inactive  Treating  
Comments



 Name  Type        Date  Date  Clinician  

 

 Unknown  None  Active  Unknown  None      Unknown  No Known



                 Allergies



                 For This



                 Patient







Medications







 Ordered  Filled  Start  Stop  Current  Ordering  Indication  Dosage  Frequency
  Signature  Comments  Components



 Medication  Medication  Date  Date  Medication?  Clinician        (SIG)    



 Name  Name                    

 

 Eliquis 2.5  Eliquis 2.5  2019    Yes  Kimberly    Unknown  Unknown      



 mg tablet  mg tablet  -04      Jen JEFF            

 

 metoprolol  metoprolol  2019    Yes  Kimberly    Unknown  Unknown      



 tartrate  tartrate  1-04      Jen JEFF            



 100 mg  100 mg                    



 tablet  tablet                    

 

 Cardizem CD  Cardizem CD  2019    Yes  Kimberly    Unknown  Unknown      



 120 mg  120 mg  1-04      Jen JEFF            



 capsule,ext  capsule,ext                    



 ended  ended                    



 release  release                    

 

 multivitami  multivitami  2019    Yes  Kimberly    Unknown  Unknown      



 n with  n with  -      Jen JEFF            



 minerals  minerals                    



 tablet  tablet                    

 

 Co Q-10 150  Co Q-10 150  2019    Yes  Kimberly    Unknown  Unknown      



 mg capsule  mg capsule  1-04      Jen JEFF            

 

 niacin ER  niacin ER  2019    Yes  Kimberly    Unknown  Unknown      



 250 mg  250 mg  1-04      Jen JEFF            



 tablet,exte  tablet,exte                    



 nded  nded                    



 release  release                    

 

 iron 27mg  iron 27mg  2019    Yes  Kimberly    Unknown  Unknown      



     1-04      Jen JEFF            

 

 pravastatin  pravastatin  2019    Yes  Kimberly    Unknown  Unknown      



 40 mg  40 mg  -15      Jen JEFF            



 tablet  tablet                    







Vital Signs







 Vital Name  Observation Time  Observation Value  Comments

 

 SYSTOLIC mm[Hg]  2019 18:08:41  128 mm[Hg] mm[Hg]  Method: Stand

 

 DIASTOLIC mm[Hg]  2019 18:08:41  70 mm[Hg] mm[Hg]  Method: Stand







Procedures

This patient has no known procedures.



Results

This patient has no known results.

## 2020-02-18 NOTE — XMS REPORT
Patient Summary Document

 Created on:2020



Patient:PARISH HOUSE Unknown

Sex:Male

:1940

External Reference #:[id number]





Demographics







 Address  [facility] [place] [address]

 

 Home Phone  [phone]

 

 Preferred Language  English

 

 Marital Status  Unknown

 

 Islam Affiliation  Unknown

 

 Race  Unknown

 

 Ethnic Group  Unknown









Author







 Organization  [facility]









Support







 Name  Relationship  Address  Phone

 

 ZI, Unknown Name  NextOfBernardino  Unknown Address  [phone]









Care Team Providers







 Name  Role  Phone

 

 Unavailable  Unavailable  Unavailable









Problems







 Condition  Condition  Condition  Status  Onset  Resolution  Last  Treating  
Comments



 Name  Details  Category    Date  Date  Treatment  Clinician  



             Date    

 

 Hypertensiv  Hypertensiv  Diagnosis  Active        Elmer  



 e heart  e heart            Smith  



 disease  disease            [id number]  



 with heart  with heart              



 failure  failure              

 

 Chronic  Chronic  Diagnosis  Active        Elmer  



 systolic  systolic            [place]  



 (congestive  (congestive            [id number]  



 ) heart  ) heart              



 failure  failure              

 

 Paroxysmal  Paroxysmal  Diagnosis  Active        Elmer  



 atrial  atrial            [place]  



 fibrillatio  fibrillatio            [id number]  



 n  n              

 

 Type 2  Type 2  Diagnosis  Active        Elmer  



 diabetes  diabetes            Smith  



 mellitus  mellitus            [id number]  



 without  without              



 complicatio  complicatio              



 ns  ns              

 

 Diverticuli  Diverticuli  Diagnosis  Active        Elmer  



 tis of  tis of            Smith  



 intestine,  intestine,            [id number]  



 part  part              



 unspecified  unspecified              



 , without  , without              



 perforation  perforation              



 or abscess  or abscess              



 without  without              



 bleeding  bleeding              

 

 Anemia,  Anemia,  Diagnosis  Active        Elmer  



 unspecified  unspecified            [place]  



               [id number]  

 

 Benign  Benign  Diagnosis  Active        Elmer  



 prostatic  prostatic            Smith  



 hyperplasia  hyperplasia            [id number]  



 without  without              



 lower  lower              



 urinary  urinary              



 tract  tract              



 symptoms  symptoms              

 

 Lymphocytop  Lymphocytop  Diagnosis  Active        Elmer  



 enia  enia            [place]  



               [id number]  

 

 Encounter  Encounter  Diagnosis  Active        Elmer  



 for  alka Maloney  



 attention  attention            [id number]  



 to  to              



 colostomy  colostomy              

 

 Long term  Long term  Diagnosis  Active        Elmer  



 (current)  (current)            [place]  



 use of  use of            [id number]  



 anticoagula  anticoagula              



 nts  nts              

 

 Personal  Personal  Diagnosis  Active        Elmer  



 history of  history of            [place]  



 transient  transient            [id number]  



 ischemic  ischemic              



 attack  attack              



 (TIA), and  (TIA), and              



 cerebral  cerebral              



 infarction  infarction              



 without  without              



 residual  residual              



 deficits  deficits              

 

 Presence of  Presence of  Diagnosis  Active        Elmer  



 cardiac  cardiac            [place]  



 pacemaker  pacemaker            [id number]  

 

 Presence of  Presence of  Diagnosis  Active        Elmer  



 prosthetic  prosthetic            Smith  



 heart valve  heart valve            [id number]  

 

 Personal  Personal  Diagnosis  Active        Elmer  



 history of  history of            Smith  



 nicotine  nicotine            [id number]  



 dependence  dependence              

 

 Problems  Problems  Diagnosis  Active        Elmer  



 related to  related to            [place]  



 living  living            [id number]  



 alone  alone              

 

 Pain  frequent  Pain Mgmt  Resolve  2019    Patricia  



   pain    d    10:55:00    [place]  



         10:30:      [id number]  



         00        

 

 Cardio  edema  Cardiovasc  Active  2019      Patricia  



     ular          [place]  



         10:30:      [id number]  



         00        

 

 Cardio  knowledge/s  Cardiovasc  Active  2019      Patricia  



   kill  ular          [place]  



   deficit: pt      10:30:      [id number]  



         00        

 

 Respiratory  dyspnea  Respirator  Resolve  2019    Patricia  



   present  y  d    10:45:00    [place]  



         10:30:      [id number]  



         00        

 

 Endo/Hema  newly  Endo/Hema  Resolve  2019    Patricia  



   diagnosed    d    09:45:00    [place]  



   diabetes      10:30:      [id number]  



         00        

 

 Endo/Hema  diabetic  Endo/Hema  Resolve  2019    Patricia  



   foot care    d    09:45:00    [place]  



         10:30:      [id number]  



         00        

 

 Endo/Hema  anti-coagul  Endo/Hema  Resolve  2019    Patricia  



   ation    d    09:45:00    [place]  



   therapy      10:30:      [id number]  



         00        

 

 Sensory  impaired  Sensory  Resolve  2019    Patricia  



   hearing    d    10:45:00    [place]  



         10:30:      [id number]  



         00        

 

 Integument  skin  Integument  Resolve  2019    Patricia  



   integrity    d    11:40:00    [place]  



   risk      10:30:      [id number]  



         00        

 

 Nutrition  nutritional  Nutrition  Resolve  2019    Patricia  



   restriction    d    11:40:00    [place]  



   s      10:30:      [id number]  



         00        

 

 Elimination  ostomy  Eliminatio  Resolve  2019    Patricia  



   present  n  d    11:40:00    [place]  



         10:30:      [id number]  



         00        

 

 Neuro  confusion  Neuro/Emot  Resolve  2019    Patricia  



   present  ion  d    10:45:00    [place]  



         10:30:      [id number]  



         00        

 

 Neuro  impaired  Neuro/Emot  Resolve  2019    Patricia  



   decision-ma  ion  d    10:45:00    [place]  



   felicia      10:30:      [id number]  



         00        

 

 Activity  ADL  Activity  Resolve  2019    Patricia  



   assistance    d    10:45:00    [place]  



   required      10:30:      [id number]  



         00        

 

 Activity  self-care  Activity  Resolve  2019    Patricia  



   deficit    d    10:45:00    [place]  



         10:30:      [id number]  



         00        

 

 Safety  structural  Safety  Resolve  2019    Patricia  



   barriers    d    10:55:00    [place]  



   present      10:30:      [id number]  



         00        

 

 Safety  fall risk  Safety  Resolve  2019    Patricia  



   factor    d    10:55:00    [place]  



   present      10:30:      [id number]  



         00        

 

 Safety  risk for  Safety  Resolve  2019    Patricia  



   hospitaliza    d    10:55:00    [place]  



   tion      10:30:      [id number]  



         00        

 

 Safety  can be left  Safety  Active  2019      Patricia  



   alone for            [place]  



   only short      10:30:      [id number]  



   periods      00        

 

 Safety  safety  Safety  Resolve  2019    QUALITY  



   hazards    d    10:55:00    REALTIME  



   present      10:30:        



         00        

 

 Medication  oral med  Meds  Resolve  2019    Patricia  



   assistance    d    10:55:00    [place]  



   required      10:30:      [id number]  



         00        

 

 Medication  knowledge/s  Meds  Resolve  2019    Patricia  



   kill    d    10:55:00    [place]  



   deficit: pt      10:30:      [id number]  



         00        

 

 Musculoskel  transfer  Musculoske  Resolve  2019    Patricia  



 etal  assistance  letal  d    10:45:00    [place]  



   required      10:30:      [id number]  



         00        

 

 Musculoskel  requires  Musculoske  Resolve  2019    Patricia  



 etal  human  letal  d    10:45:00    [place]  



   assist to      10:30:      [id number]  



   leave home      00        

 

 Cath/Ostomy  k/s  Cath\Ostom  Resolve  2019    Patricia  



 /GI  deficit:  y\GI Care  d    11:40:00    [place]  



   cath/ost/GI      10:30:      [id number]  



   care - pt      00        

 

 Balance/End  balance/  PT/OT:  Resolve  2019    Shyam  



 urance  rdination  Balance/En  d    10:45:00    [place]  



   deficit  durance    12:07:      [id number]  



         00        

 

 OT: Self  self-care  OT:  Resolve  2019    Shyam  



 Care  deficit  Self-Care  d    10:45:00    [place]  



         12:07:      [id number]  



         00        

 

 Gait/Locomo  stair  PT/OT:  Resolve  2019    Shyam  



 tion  management  Gait/Locom  d    10:45:00    [place]  



 problems  req  otion    12:07:      [id number]  



         00        

 

 Gait/Locomo  gait  PT/OT:  Resolve  2019    Shyam  



 tion  deficit  Gait/Locom  d    10:45:00    [place]  



 problems    otion    12:07:      [id number]  



         00        

 

 Elimination  ostomy  Eliminatio  Resolve  2019    Elmer  



   present  n  d  1-15  10:55:00    [place]  



         09:55:      [id number]  



         00        

 

 Elimination  urinary  Eliminatio  Resolve  2019    Elmer  



   incontinenc  n  d    10:45:00    [place] gill      10:45:      [id number]  



         00        

 

 Elimination  ostomy  Eliminatio  Resolve  2019    Elmer  



   present  n  d  -  10:45:00    [place]  



         10:45:      [id number]  



         00        

 

 Safety  risk for  Safety  Active  2019      Elmer  



   hospitaliza            [place]  



   tion      10:50:      [id number]  



         00        

 

 Activity  ADL  Activity  Unknown  2019      Elmer  



   assistance            [place]  



   required      10:45:      [id number]  



         00        

 

 Safety  fall risk  Safety  Active  2019      Elmer  



   factor            [place]  



   present      10:45:      [id number]  



         00        

 

 Sensory  impaired  Sensory  Active        [place]  



   hearing            [place]  



         11:30:      [id number]  



         00        

 

 Elimination  urinary  Eliminatio  Resolve  2020    Cherrise  



   incontinenc  n  d    10:55:00    Ruth  



   e      11:30:      [id number]  



         00        

 

 Elimination  urinary  Eliminatio  Resolve  2020    Cherrise  



   urgency  n  d    10:55:00    [place]  



         11:30:      [id number]  



         00        

 

 Elimination  ostomy  Eliminatio  Active        Cher[place]  



   present  n          Ruth  



         11:30:      [id number]  



         00        

 

 Neuro  impaired  Neuro/Emot  Active        Cherrise  



   decision-ma  ion          Ruth  



   felicia      11:30:      [id number]  



         00        

 

 Safety  structural  Safety  Active        Cherrise  



   barriers            Ruth  



   present      11:30:      [id number]  



         00        

 

 Safety  sanitation  Safety  Active        Cherrise  



   hazards            Ruth  



   present      11:30:      [id number]  



         00        

 

 Musculoskel  requires  Musculoske  Active        Cherrise  



 etal  human  letal          Ruth  



   assist to      11:30:      [id number]  



   leave home      00        







Allergies, Adverse Reactions, Alerts







 Allergy  Allergy  Status  Severity  Reaction(s)  Onset  Inactive  Treating  
Comments



 Name  Type        Date  Date  Clinician  

 

 Unknown  None  Active  Unknown  None      Unknown  No Known



                 Allergies



                 For This



                 Patient







Medications







 Ordered  Filled  Start  Stop  Current  Ordering  Indication  Dosage  Frequency
  Signature  Comments  Components



 Medication  Medication  Date  Date  Medication?  Clinician        (SIG)    



 Name  Name                    

 

 Eliquis 2.5  Eliquis 2.5  2019    No  [place]    Unknown  Unknown      



 mg tablet  mg tablet        Jen JEFF            

 

 metoprolol  metoprolol  2019    No  [facility]    Unknown  Unknown      



 tartrate  tartrate        Jen JEFF            



 100 mg  100 mg                    



 tablet  tablet                    

 

 Cardizem CD  Cardizem CD  2019    No  [place]    Unknown  Unknown      



 120 mg  120 mg        Jen JEFF            



 capsule,ext  capsule,ext                    



 ended  ended                    



 release  release                    

 

 multivitami  multivitami  2019    No  [place]    Unknown  Unknown      



 n with  n with        Jen JEFF            



 minerals  minerals                    



 tablet  tablet                    

 

 Co Q-10 150  Co Q-10 150  2019    No  [place]    Unknown  Unknown      



 mg capsule  mg capsule        Jen JEFF            

 

 niacin ER  niacin ER  2019    No  [place]    Unknown  Unknown      



 250 mg  250 mg  -      Jen JEFF            



 tablet,exte  tablet,exte                    



 nded  nded                    



 release  release                    

 

 iron 27mg  iron 27mg  2019    No  [place]    Unknown  Unknown      



     -      Jen JEFF            

 

 pravastatin  pravastatin  2019    Yes  [facility]    Unknown  Unknown      



 40 mg  40 mg  1-15      Jen JEFF            



 tablet  tablet                    







Vital Signs







 Vital Name  Observation Time  Observation Value  Comments

 

 SYSTOLIC mm[Hg]  2020 18:10:13  132 mm[Hg] mm[Hg]  Method: Sit

 

 SYSTOLIC mm[Hg]  2019 18:08:41  128 mm[Hg] mm[Hg]  Method: Stand

 

 DIASTOLIC mm[Hg]  2020 18:10:13  72 mm[Hg] mm[Hg]  Method: Sit

 

 DIASTOLIC mm[Hg]  2019 18:08:41  70 mm[Hg] mm[Hg]  Method: Stand

 

 PULSE  2020 18:10:13  73 /min /min  

 

 RESP RATE  2020 18:10:13  16 /min /min  

 

 TEMP  2020 18:10:13  97.1 [degF]  







Procedures

This patient has no known procedures.



Results

This patient has no known results.

## 2020-02-18 NOTE — XMS REPORT
Patient Summary Document

 Created on:2020



Patient:PAR[facility] HOUSE Unknown

Sex:Male

:1940

External Reference #:[id number]





Demographics







 Address  [address] [place] [address]

 

 Home Phone  [phone]

 

 Preferred Language  English

 

 Marital Status  Unknown

 

 Yazdanism Affiliation  Unknown

 

 Race  Unknown

 

 Ethnic Group  Unknown









Author







 Organization  [facility]









Support







 Name  Relationship  Address  Phone

 

 ZI, Unknown Name  NextOfBernardino  Unknown Address  [phone]









Care Team Providers







 Name  Role  Phone

 

 Unavailable  Unavailable  Unavailable









Problems







 Condition  Condition  Condition  Status  Onset  Resolution  Last  Treating  
Comments



 Name  Details  Category    Date  Date  Treatment  Clinician  



             Date    

 

 Hypertensiv  Hypertensiv  Diagnosis  Active        Elmer  



 e heart  e heart            Smith  



 disease  disease            [id number]  



 with heart  with heart              



 failure  failure              

 

 Chronic  Chronic  Diagnosis  Active        Elmer  



 systolic  systolic            [place]  



 (congestive  (congestive            [id number]  



 ) heart  ) heart              



 failure  failure              

 

 Paroxysmal  Paroxysmal  Diagnosis  Active        Elmer  



 atrial  atrial            [place]  



 fibrillatio  fibrillatio            [id number]  



 n  n              

 

 Type 2  Type 2  Diagnosis  Active        Elmer  



 diabetes  diabetes            Smith  



 mellitus  mellitus            [id number]  



 without  without              



 complicatio  complicatio              



 ns  ns              

 

 Diverticuli  Diverticuli  Diagnosis  Active        Elmer  



 tis of  tis of            Smith  



 intestine,  intestine,            [id number]  



 part  part              



 unspecified  unspecified              



 , without  , without              



 perforation  perforation              



 or abscess  or abscess              



 without  without              



 bleeding  bleeding              

 

 Anemia,  Anemia,  Diagnosis  Active        Elmer  



 unspecified  unspecified            [place]  



               [id number]  

 

 Benign  Benign  Diagnosis  Active        Elmer  



 prostatic  prostatic            Smith  



 hyperplasia  hyperplasia            [id number]  



 without  without              



 lower  lower              



 urinary  urinary              



 tract  tract              



 symptoms  symptoms              

 

 Lymphocytop  Lymphocytop  Diagnosis  Active        Elmer  



 enia  enia            [place]  



               [id number]  

 

 Encounter  Encounter  Diagnosis  Active        Elmer  



 for  alka Maloney  



 attention  attention            [id number]  



 to  to              



 colostomy  colostomy              

 

 Long term  Long term  Diagnosis  Active        Elmer  



 (current)  (current)            [place]  



 use of  use of            [id number]  



 anticoagula  anticoagula              



 nts  nts              

 

 Personal  Personal  Diagnosis  Active        Elmer  



 history of  history of            [place]  



 transient  transient            [id number]  



 ischemic  ischemic              



 attack  attack              



 (TIA), and  (TIA), and              



 cerebral  cerebral              



 infarction  infarction              



 without  without              



 residual  residual              



 deficits  deficits              

 

 Presence of  Presence of  Diagnosis  Active        Elmer  



 cardiac  cardiac            [place]  



 pacemaker  pacemaker            [id number]  

 

 Presence of  Presence of  Diagnosis  Active        Elmer  



 prosthetic  prosthetic            Smith  



 heart valve  heart valve            [id number]  

 

 Personal  Personal  Diagnosis  Active        Elmer  



 history of  history of            Smith  



 nicotine  nicotine            [id number]  



 dependence  dependence              

 

 Problems  Problems  Diagnosis  Active        Elmer  



 related to  related to            [place]  



 living  living            [id number]  



 alone  alone              

 

 Pain  frequent  Pain Mgmt  Resolve  2019    Patricia  



   pain    d    10:55:00    [place]  



         10:30:      [id number]  



         00        

 

 Cardio  edema  Cardiovasc  Active  2019      Patricia  



     ular          [place]  



         10:30:      [id number]  



         00        

 

 Cardio  knowledge/s  Cardiovasc  Active  2019      Patricia  



   kill  ular          [place]  



   deficit: pt      10:30:      [id number]  



         00        

 

 Respiratory  dyspnea  Respirator  Resolve  2019    Patricia  



   present  y  d    10:45:00    [place]  



         10:30:      [id number]  



         00        

 

 Endo/Hema  newly  Endo/Hema  Resolve  2019    Patricia  



   diagnosed    d    09:45:00    [place]  



   diabetes      10:30:      [id number]  



         00        

 

 Endo/Hema  diabetic  Endo/Hema  Resolve  2019    Patricia  



   foot care    d    09:45:00    [place]  



         10:30:      [id number]  



         00        

 

 Endo/Hema  anti-coagul  Endo/Hema  Resolve  2019    Patricia  



   ation    d    09:45:00    [place]  



   therapy      10:30:      [id number]  



         00        

 

 Sensory  impaired  Sensory  Resolve  2019    Patricia  



   hearing    d    10:45:00    [place]  



         10:30:      [id number]  



         00        

 

 Integument  skin  Integument  Resolve  2019    Patricia  



   integrity    d    11:40:00    [place]  



   risk      10:30:      [id number]  



         00        

 

 Nutrition  nutritional  Nutrition  Resolve  2019    Patricia  



   restriction    d    11:40:00    [place]  



   s      10:30:      [id number]  



         00        

 

 Elimination  ostomy  Eliminatio  Resolve  2019    Patricia  



   present  n  d    11:40:00    [place]  



         10:30:      [id number]  



         00        

 

 Neuro  confusion  Neuro/Emot  Resolve  2019    Patricia  



   present  ion  d    10:45:00    [place]  



         10:30:      [id number]  



         00        

 

 Neuro  impaired  Neuro/Emot  Resolve  2019    Patricia  



   decision-ma  ion  d    10:45:00    [place]  



   felicia      10:30:      [id number]  



         00        

 

 Activity  ADL  Activity  Resolve  2019    Patricia  



   assistance    d    10:45:00    [place]  



   required      10:30:      [id number]  



         00        

 

 Activity  self-care  Activity  Resolve  2019    Patricia  



   deficit    d    10:45:00    [place]  



         10:30:      [id number]  



         00        

 

 Safety  structural  Safety  Resolve  2019    Patricia  



   barriers    d    10:55:00    [place]  



   present      10:30:      [id number]  



         00        

 

 Safety  fall risk  Safety  Resolve  2019    Patricia  



   factor    d    10:55:00    [place]  



   present      10:30:      [id number]  



         00        

 

 Safety  risk for  Safety  Resolve  2019    Patricia  



   hospitaliza    d    10:55:00    [place]  



   tion      10:30:      [id number]  



         00        

 

 Safety  can be left  Safety  Active  2019      Patricia  



   alone for            [place]  



   only short      10:30:      [id number]  



   periods      00        

 

 Safety  safety  Safety  Resolve  2019    QUALITY  



   hazards    d    10:55:00    REALTIME  



   present      10:30:        



         00        

 

 Medication  oral med  Meds  Resolve  2019    Patricia  



   assistance    d    10:55:00    [place]  



   required      10:30:      [id number]  



         00        

 

 Medication  knowledge/s  Meds  Resolve  2019    Patircia  



   kill    d    10:55:00    [place]  



   deficit: pt      10:30:      [id number]  



         00        

 

 Musculoskel  transfer  Musculoske  Resolve  2019    Patricia  



 etal  assistance  letal  d    10:45:00    [place]  



   required      10:30:      [id number]  



         00        

 

 Musculoskel  requires  Musculoske  Resolve  2019    Patricia  



 etal  human  letal  d    10:45:00    [place]  



   assist to      10:30:      [id number]  



   leave home      00        

 

 Cath/Ostomy  k/s  Cath\Ostom  Resolve  2019    Patricia  



 /GI  deficit:  y\GI Care  d    11:40:00    [place]  



   cath/ost/GI      10:30:      [id number]  



   care - pt      00        

 

 Balance/End  balance/  PT/OT:  Resolve  2019    Shyam  



 urance  rdination  Balance/En  d    10:45:00    Ebonyziewicz  



   deficit  durance    12:07:      [id number]  



         00        

 

 OT: Self  self-care  OT:  Resolve  2019    Shyam  



 Care  deficit  Self-Care  d    10:45:00    Kobziewicz  



         12:07:      [id number]  



         00        

 

 Gait/Locomo  stair  PT/OT:  Resolve  2019    Shyam  



 tion  management  Gait/Locom  d    10:45:00    Kobziewicz  



 problems  req  otion    12:07:      [id number]  



         00        

 

 Gait/Locomo  gait  PT/OT:  Resolve  2019    Shyam  



 tion  deficit  Gait/Locom  d    10:45:00    Kobziewicz  



 problems    otion    12:07:      [id number]  



         00        

 

 Elimination  ostomy  Eliminatio  Resolve  2019    Elmer  



   present  n  d  1-15  10:55:00    [place]  



         09:55:      [id number]  



         00        

 

 Elimination  urinary  Eliminatio  Resolve  2019    Elmer  



   incontinenc  n  d    10:45:00    [place] gill      10:45:      [id number]  



         00        

 

 Elimination  ostomy  Eliminatio  Resolve  2019    Elmer  



   present  n  d  -  10:45:00    [place]  



         10:45:      [id number]  



         00        

 

 Safety  risk for  Safety  Active  2019      Elmer  



   hospitaliza            [place]oney  



   tion      10:50:      [id number]  



         00        

 

 Activity  ADL  Activity  Unknown  2019      Elmer  



   assistance            [place]  



   required      10:45:      [id number]  



         00        

 

 Safety  fall risk  Safety  Active  2019      Elmer  



   factor            [place]oney  



   present      10:45:      [id number]  



         00        

 

 Sensory  impaired  Sensory  Active        Cherrise  



   hearing            [place]  



         11:30:      [id number]  



         00        

 

 Elimination  urinary  Eliminatio  Resolve  2020    Cherrise  



   incontinenc  n  d    10:55:00    [place]ona  



   e      11:30:      [id number]  



         00        

 

 Elimination  urinary  Eliminatio  Resolve  2020    Cherrise  



   urgency  n  d    10:55:00    [place]  



         11:30:      [id number]  



         00        

 

 Elimination  ostomy  Eliminatio  Active        Cherrise  



   present  n          [place]  



         11:30:      [id number]  



         00        

 

 Neuro  impaired  Neuro/Emot  Active        Cherrise  



   decision-ma  ion          [place]  



   felicia      11:30:      [id number]  



         00        

 

 Safety  structural  Safety  Active        Cherrise  



   barriers            [place]  



   present      11:30:      [id number]  



         00        

 

 Safety  sanitation  Safety  Active        Cherrise  



   hazards            [place]  



   present      11:30:      [id number]  



         00        

 

 Musculoskel  requires  Musculoske  Active        Cherrise  



 etal  human  letal          [place]  



   assist to      11:30:      [id number]  



   leave home      00        







Allergies, Adverse Reactions, Alerts







 Allergy  Allergy  Status  Severity  Reaction(s)  Onset  Inactive  Treating  
Comments



 Name  Type        Date  Date  Clinician  

 

 Unknown  None  Active  Unknown  None      Unknown  No Known



                 Allergies



                 For This



                 Patient







Medications







 Ordered  Filled  Start  Stop  Current  Ordering  Indication  Dosage  Frequency
  Signature  Comments  Components



 Medication  Medication  Date  Date  Medication?  Clinician        (SIG)    



 Name  Name                    

 

 Eliquis 2.5  Eliquis 2.5  2019    No  [place]    Unknown  Unknown      



 mg tablet  mg tablet        Jen JEFF            

 

 metoprolol  metoprolol  2019    No  [place]    Unknown  Unknown      



 tartrate  tartrate        Jen JEFF            



 100 mg  100 mg                    



 tablet  tablet                    

 

 Cardizem CD  Cardizem CD  2019    No  [place]    Unknown  Unknown      



 120 mg  120 mg  -      Jen JEFF            



 capsule,ext  capsule,ext                    



 ended  ended                    



 release  release                    

 

 multivitami  multivitami  2019    No  [place]    Unknown  Unknown      



 n with  n with        Jen JEFF            



 minerals  minerals                    



 tablet  tablet                    

 

 Co Q-10 150  Co Q-10 150  2019    No  [place]    Unknown  Unknown      



 mg capsule  mg capsule  -      Jen JEFF            

 

 niacin ER  niacin ER  2019    No  [place]    Unknown  Unknown      



 250 mg  250 mg  -      Jen JEFF            



 tablet,exte  tablet,exte                    



 nded  nded                    



 release  release                    

 

 iron 27mg  iron 27mg  2019    No  [place]    Unknown  Unknown      



     -      Jen JEFF            

 

 pravastatin  pravastatin  2019    Yes  [place]    Unknown  Unknown      



 40 mg  40 mg  1-15      Jen JEFF            



 tablet  tablet                    







Vital Signs







 Vital Name  Observation Time  Observation Value  Comments

 

 SYSTOLIC mm[Hg]  2020 18:10:20  136 mm[Hg] mm[Hg]  Method: Sit

 

 SYSTOLIC mm[Hg]  2019 18:08:41  128 mm[Hg] mm[Hg]  Method: Stand

 

 DIASTOLIC mm[Hg]  2020 18:10:20  68 mm[Hg] mm[Hg]  Method: Sit

 

 DIASTOLIC mm[Hg]  2019 18:08:41  70 mm[Hg] mm[Hg]  Method: Stand

 

 PULSE  2020 18:10:20  68 /min /min  

 

 RESP RATE  2020 18:10:20  16 /min /min  

 

 TEMP  2020 18:10:20  97.8 [degF]  







Procedures

This patient has no known procedures.



Results

This patient has no known results.

## 2020-02-18 NOTE — XMS REPORT
Patient Summary Document

 Created on:2020



Patient:PARISH HOUSE Unknown

Sex:Male

:1940

External Reference #:[id number]





Demographics







 Address  [facility] [place] [address]

 

 Home Phone  [phone]

 

 Preferred Language  English

 

 Marital Status  Unknown

 

 Sabianist Affiliation  Unknown

 

 Race  Unknown

 

 Ethnic Group  Unknown









Author







 Organization  [facility]









Support







 Name  Relationship  Address  Phone

 

 ZI, Unknown Name  NextOfBernardino  Unknown Address  [phone]









Care Team Providers







 Name  Role  Phone

 

 Unavailable  Unavailable  Unavailable









Problems







 Condition  Condition  Condition  Status  Onset  Resolution  Last  Treating  
Comments



 Name  Details  Category    Date  Date  Treatment  Clinician  



             Date    

 

 Hypertensiv  Hypertensiv  Diagnosis  Active        Elmer  



 e heart  e heart            Smith  



 disease  disease            [id number]  



 with heart  with heart              



 failure  failure              

 

 Chronic  Chronic  Diagnosis  Active        Elmer  



 systolic  systolic            Haider  



 (congestive  (congestive            [id number]  



 ) heart  ) heart              



 failure  failure              

 

 Paroxysmal  Paroxysmal  Diagnosis  Active        Elmer  



 atrial  atrial            Haider  



 fibrillatio  fibrillatio            [id number]  



 n  n              

 

 Type 2  Type 2  Diagnosis  Active        Elmer  



 diabetes  diabetes            Smith  



 mellitus  mellitus            [id number]  



 without  without              



 complicatio  complicatio              



 ns  ns              

 

 Diverticuli  Diverticuli  Diagnosis  Active        Elmer  



 tis of  tis of            Smith  



 intestine,  intestine,            [id number]  



 part  part              



 unspecified  unspecified              



 , without  , without              



 perforation  perforation              



 or abscess  or abscess              



 without  without              



 bleeding  bleeding              

 

 Anemia,  Anemia,  Diagnosis  Active        Elmer  



 unspecified  unspecified            Haider  



               [id number]  

 

 Benign  Benign  Diagnosis  Active        Elmer  



 prostatic  prostatic            Smith  



 hyperplasia  hyperplasia            [id number]  



 without  without              



 lower  lower              



 urinary  urinary              



 tract  tract              



 symptoms  symptoms              

 

 Lymphocytop  Lymphocytop  Diagnosis  Active        Elmer  



 enia  enia            Haider  



               [id number]  

 

 Encounter  Encounter  Diagnosis  Active        Elmer  



 for  alka Maloney  



 attention  attention            [id number]  



 to  to              



 colostomy  colostomy              

 

 Long term  Long term  Diagnosis  Active        Elmer  



 (current)  (current)            Haider  



 use of  use of            [id number]  



 anticoagula  anticoagula              



 nts  nts              

 

 Personal  Personal  Diagnosis  Active        Elmer  



 history of  history of            Haider  



 transient  transient            [id number]  



 ischemic  ischemic              



 attack  attack              



 (TIA), and  (TIA), and              



 cerebral  cerebral              



 infarction  infarction              



 without  without              



 residual  residual              



 deficits  deficits              

 

 Presence of  Presence of  Diagnosis  Active        Elmer  



 cardiac  cardiac            Haider  



 pacemaker  pacemaker            [id number]  

 

 Presence of  Presence of  Diagnosis  Active        Elmer  



 prosthetic  prosthetic            Smith  



 heart valve  heart valve            [id number]  

 

 Personal  Personal  Diagnosis  Active        Elmer  



 history of  history of            Smith  



 nicotine  nicotine            [id number]  



 dependence  dependence              

 

 Problems  Problems  Diagnosis  Active        Elmer  



 related to  related to            [place]ider  



 living  living            [id number]  



 alone  alone              

 

 Pain  frequent  Pain Mgmt  Resolve  2019    Patricia  



   pain    d    10:55:00    [place]  



         10:30:      [id number]  



         00        

 

 Cardio  edema  Cardiovasc  Active  2019      Patricia  



     ular          [place]  



         10:30:      [id number]  



         00        

 

 Cardio  knowledge/s  Cardiovasc  Active  2019      Patricia  



   kill  ular          [place]  



   deficit: pt      10:30:      [id number]  



         00        

 

 Respiratory  dyspnea  Respirator  Resolve  2019    Patricia  



   present  y  d    10:45:00    [place]  



         10:30:      [id number]  



         00        

 

 Endo/Hema  newly  Endo/Hema  Resolve  2019    Patricia  



   diagnosed    d    09:45:00    [place]  



   diabetes      10:30:      [id number]  



         00        

 

 Endo/Hema  diabetic  Endo/Hema  Resolve  2019    Patricia  



   foot care    d    09:45:00    [place]  



         10:30:      [id number]  



         00        

 

 Endo/Hema  anti-coagul  Endo/Hema  Resolve  2019    Patricia  



   ation    d    09:45:00    [place]  



   therapy      10:30:      [id number]  



         00        

 

 Sensory  impaired  Sensory  Resolve  2019    Patricia  



   hearing    d    10:45:00    [place]  



         10:30:      [id number]  



         00        

 

 Integument  skin  Integument  Resolve  2019    Patricia  



   integrity    d    11:40:00    [place]  



   risk      10:30:      [id number]  



         00        

 

 Nutrition  nutritional  Nutrition  Resolve  2019    Patricia  



   restriction    d    11:40:00    [place]  



   s      10:30:      [id number]  



         00        

 

 Elimination  ostomy  Eliminatio  Resolve  2019    Patricia  



   present  n  d    11:40:00    [place]  



         10:30:      [id number]  



         00        

 

 Neuro  confusion  Neuro/Emot  Resolve  2019    Patricia  



   present  ion  d    10:45:00    [place]  



         10:30:      [id number]  



         00        

 

 Neuro  impaired  Neuro/Emot  Resolve  2019    Patricia  



   decision-ma  ion  d    10:45:00    [place]  



   felicia      10:30:      [id number]  



         00        

 

 Activity  ADL  Activity  Resolve  2019    Patricia  



   assistance    d    10:45:00    Harper  



   required      10:30:      [id number]  



         00        

 

 Activity  self-care  Activity  Resolve  2019    Patricia  



   deficit    d    10:45:00    [place]  



         10:30:      [id number]  



         00        

 

 Safety  structural  Safety  Resolve  2019    Patricia  



   barriers    d    10:55:00    [place]  



   present      10:30:      [id number]  



         00        

 

 Safety  fall risk  Safety  Resolve  2019    Patricia  



   factor    d    10:55:00    [place]  



   present      10:30:      [id number]  



         00        

 

 Safety  risk for  Safety  Resolve  2019    Patricia  



   hospitaliza    d    10:55:00    [place]  



   tion      10:30:      [id number]  



         00        

 

 Safety  can be left  Safety  Active  2019      Patricia  



   alone for            [place]  



   only short      10:30:      [id number]  



   periods      00        

 

 Safety  safety  Safety  Resolve  2019    QUALITY  



   hazards    d    10:55:00    REALTIME  



   present      10:30:        



         00        

 

 Medication  oral med  Meds  Resolve  2019    Patricia  



   assistance    d    10:55:00    [place]  



   required      10:30:      [id number]  



         00        

 

 Medication  knowledge/s  Meds  Resolve  2019    Patricia  



   kill    d    10:55:00    [place]  



   deficit: pt      10:30:      [id number]  



         00        

 

 Musculoskel  transfer  Musculoske  Resolve  2019    Patricia  



 etal  assistance  letal  d    10:45:00    [place]  



   required      10:30:      [id number]  



         00        

 

 Musculoskel  requires  Musculoske  Resolve  2019    Patricia  



 etal  human  letal  d    10:45:00    [place]  



   assist to      10:30:      [id number]  



   leave home      00        

 

 Cath/Ostomy  k/s  Cath\Ostom  Resolve  2019    Patricia  



 /GI  deficit:  y\GI Care  d    11:40:00    [place]  



   cath/ost/GI      10:30:      [id number]  



   care - pt      00        

 

 Balance/End  balance/  PT/OT:  Resolve  2019    Shyam kimance  rdination  Balance/En  d    10:45:00    Kobziewicz  



   deficit  durance    12:07:      [id number]  



         00        

 

 OT: Self  self-care  OT:  Resolve  2019    Shyam  



 Care  deficit  Self-Care  d    10:45:00    Kobziewicz  



         12:07:      [id number]  



         00        

 

 Gait/Locomo  stair  PT/OT:  Resolve  2019    Shyam  



 tion  management  Gait/Locom  d    10:45:00    Kobziewicz  



 problems  req  otion    12:07:      [id number]  



         00        

 

 Gait/Locomo  gait  PT/OT:  Resolve  2019    Shyam  



 tion  deficit  Gait/Locom  d    10:45:00    Kobziewicz  



 problems    otion    12:07:      [id number]  



         00        

 

 Elimination  ostomy  Eliminatio  Resolve  2019    Elmer  



   present  n  d  1-15  10:55:00    [place]  



         09:55:      [id number]  



         00        

 

 Elimination  urinary  Eliminatio  Resolve  2019    Elmer  



   incontinenc  n  d  2-11  10:45:00    [place] gill      10:45:      [id number]  



         00        

 

 Elimination  ostomy  Eliminatio  Resolve  2019    Elmer  



   present  n  d  2-11  10:45:00    [place]  



         10:45:      [id number]  



         00        

 

 Safety  risk for  Safety  Active  2019      Elmer  



   hospitaliza            [place]  



   tion      10:50:      [id number]  



         00        

 

 Activity  ADL  Activity  Unknown  2019      Elmer  



   assistance            Maloney  



   required      10:45:      [id number]  



         00        

 

 Safety  fall risk  Safety  Active  2019      Elmer  



   factor            Maloney  



   present      10:45:      [id number]  



         00        







Allergies, Adverse Reactions, Alerts







 Allergy  Allergy  Status  Severity  Reaction(s)  Onset  Inactive  Treating  
Comments



 Name  Type        Date  Date  Clinician  

 

 Unknown  None  Active  Unknown  None      Unknown  No Known



                 Allergies



                 For This



                 Patient







Medications







 Ordered  Filled  Start  Stop  Current  Ordering  Indication  Dosage  Frequency
  Signature  Comments  Components



 Medication  Medication  Date  Date  Medication?  Clinician        (SIG)    



 Name  Name                    

 

 Eliquis 2.5  Eliquis 2.5  2019    Yes  Falun    Unknown  Unknown      



 mg tablet  mg tablet        Jen JEFF            

 

 metoprolol  metoprolol  2019    Yes  Falun    Unknown  Unknown      



 tartrate  tartrate  1-04      Jen JEFF            



 100 mg  100 mg                    



 tablet  tablet                    

 

 Cardizem CD  Cardizem CD  2019    Yes  Falun    Unknown  Unknown      



 120 mg  120 mg  1-04      Jen JEFF            



 capsule,ext  capsule,ext                    



 ended  ended                    



 release  release                    

 

 multivitami  multivitami  2019    Yes  Falun    Unknown  Unknown      



 n with  n with  -      Jen JEFF            



 minerals  minerals                    



 tablet  tablet                    

 

 Co Q-10 150  Co Q-10 150  2019    Yes  Falun    Unknown  Unknown      



 mg capsule  mg capsule  1-04      Jen JEFF            

 

 niacin ER  niacin ER  2019    Yes  Falun    Unknown  Unknown      



 250 mg  250 mg  -04      Jen JEFF            



 tablet,exte  tablet,exte                    



 nded  nded                    



 release  release                    

 

 iron 27mg  iron 27mg  2019    Yes  Falun    Unknown  Unknown      



     1-04      Jen JEFF            

 

 pravastatin  pravastatin  2019    Yes  Falun    Unknown  Unknown      



 40 mg  40 mg  -15      Jen JEFF            



 tablet  tablet                    







Vital Signs







 Vital Name  Observation Time  Observation Value  Comments

 

 SYSTOLIC mm[Hg]  2019 18:09:38  136 mm[Hg] mm[Hg]  Method: Sit

 

 SYSTOLIC mm[Hg]  2019 18:08:41  128 mm[Hg] mm[Hg]  Method: Stand

 

 DIASTOLIC mm[Hg]  2019 18:09:38  80 mm[Hg] mm[Hg]  Method: Sit

 

 DIASTOLIC mm[Hg]  2019 18:08:41  70 mm[Hg] mm[Hg]  Method: Stand

 

 PULSE  2019 18:09:38  72 /min /min  

 

 RESP RATE  2019 18:09:38  16 /min /min  

 

 TEMP  2019 18:09:38  97.8 [degF]  







Procedures

This patient has no known procedures.



Results

This patient has no known results.

## 2020-02-18 NOTE — XMS REPORT
Patient Summary Document

 Created on:2020



Patient:PARISH HOUSE Unknown

Sex:Male

:1940

External Reference #:[id number]





Demographics







 Address  [facility] [place] [address]

 

 Home Phone  [phone]

 

 Preferred Language  English

 

 Marital Status  Unknown

 

 Christian Affiliation  Unknown

 

 Race  Unknown

 

 Ethnic Group  Unknown









Author







 Organization  [facility]









Support







 Name  Relationship  Address  Phone

 

 ZI, Unknown Name  NextOfBernardino  Unknown Address  [phone]









Care Team Providers







 Name  Role  Phone

 

 Unavailable  Unavailable  Unavailable









Problems







 Condition  Condition  Condition  Status  Onset  Resolution  Last  Treating  
Comments



 Name  Details  Category    Date  Date  Treatment  Clinician  



             Date    

 

 Hypertensiv  Hypertensiv  Diagnosis  Active        Elmer  



 e heart  e heart            Smith  



 disease  disease            [id number]  



 with heart  with heart              



 failure  failure              

 

 Chronic  Chronic  Diagnosis  Active        Elmer  



 systolic  systolic            Haider  



 (congestive  (congestive            [id number]  



 ) heart  ) heart              



 failure  failure              

 

 Paroxysmal  Paroxysmal  Diagnosis  Active        Elmer  



 atrial  atrial            Haider  



 fibrillatio  fibrillatio            [id number]  



 n  n              

 

 Type 2  Type 2  Diagnosis  Active        Elmer  



 diabetes  diabetes            Smith  



 mellitus  mellitus            [id number]  



 without  without              



 complicatio  complicatio              



 ns  ns              

 

 Diverticuli  Diverticuli  Diagnosis  Active        Elmer  



 tis of  tis of            Smith  



 intestine,  intestine,            [id number]  



 part  part              



 unspecified  unspecified              



 , without  , without              



 perforation  perforation              



 or abscess  or abscess              



 without  without              



 bleeding  bleeding              

 

 Anemia,  Anemia,  Diagnosis  Active        Elmer  



 unspecified  unspecified            Haider  



               [id number]  

 

 Benign  Benign  Diagnosis  Active        Elmer  



 prostatic  prostatic            Smith  



 hyperplasia  hyperplasia            [id number]  



 without  without              



 lower  lower              



 urinary  urinary              



 tract  tract              



 symptoms  symptoms              

 

 Lymphocytop  Lymphocytop  Diagnosis  Active        Elmer  



 enia  enia            Haider  



               [id number]  

 

 Encounter  Encounter  Diagnosis  Active        Elmer  



 for  alka Maloney  



 attention  attention            [id number]  



 to  to              



 colostomy  colostomy              

 

 Long term  Long term  Diagnosis  Active        Elmer  



 (current)  (current)            Haider  



 use of  use of            [id number]  



 anticoagula  anticoagula              



 nts  nts              

 

 Personal  Personal  Diagnosis  Active        Elmer  



 history of  history of            Haider  



 transient  transient            [id number]  



 ischemic  ischemic              



 attack  attack              



 (TIA), and  (TIA), and              



 cerebral  cerebral              



 infarction  infarction              



 without  without              



 residual  residual              



 deficits  deficits              

 

 Presence of  Presence of  Diagnosis  Active        Elmer  



 cardiac  cardiac            Haider  



 pacemaker  pacemaker            [id number]  

 

 Presence of  Presence of  Diagnosis  Active        Elmer  



 prosthetic  prosthetic            Smith  



 heart valve  heart valve            [id number]  

 

 Personal  Personal  Diagnosis  Active        Elmer  



 history of  history of            Smith  



 nicotine  nicotine            [id number]  



 dependence  dependence              

 

 Problems  Problems  Diagnosis  Active        Elmer  



 related to  related to            Haider  



 living  living            [id number]  



 alone  alone              

 

 Pain  frequent  Pain Mgmt  Resolve  2019    Patricia  



   pain    d    10:55:00    Flowery Branch  



         10:30:      [id number]  



         00        

 

 Cardio  edema  Cardiovasc  Active  2019      Patricia  



     ular          Flowery Branch  



         10:30:      [id number]  



         00        

 

 Cardio  knowledge/s  Cardiovasc  Active  2019      Patricia  



   kill  ular          Flowery Branch  



   deficit: pt      10:30:      [id number]  



         00        

 

 Respiratory  dyspnea  Respirator  Resolve  2019    Patricia  



   present  y  d    10:45:00    Flowery Branch  



         10:30:      [id number]  



         00        

 

 Endo/Hema  newly  Endo/Hema  Resolve  2019    Patricia  



   diagnosed    d    09:45:00    Flowery Branch  



   diabetes      10:30:      [id number]  



         00        

 

 Endo/Hema  diabetic  Endo/Hema  Resolve  2019    Patricia  



   foot care    d    09:45:00    Flowery Branch  



         10:30:      [id number]  



         00        

 

 Endo/Hema  anti-coagul  Endo/Hema  Resolve  2019    Patricia  



   ation    d    09:45:00    Flowery Branch  



   therapy      10:30:      [id number]  



         00        

 

 Sensory  impaired  Sensory  Resolve  2019    Patricia  



   hearing    d    10:45:00    Flowery Branch  



         10:30:      [id number]  



         00        

 

 Integument  skin  Integument  Resolve  2019    Patricia  



   integrity    d    11:40:00    Flowery Branch  



   risk      10:30:      [id number]  



         00        

 

 Nutrition  nutritional  Nutrition  Resolve  2019    Patricia  



   restriction    d    11:40:00    Flowery Branch  



   s      10:30:      [id number]  



         00        

 

 Elimination  ostomy  Eliminatio  Resolve  2019    Patricia  



   present  n  d    11:40:00    Flowery Branch  



         10:30:      [id number]  



         00        

 

 Neuro  confusion  Neuro/Emot  Resolve  2019    Patricia  



   present  ion  d    10:45:00    Flowery Branch  



         10:30:      [id number]  



         00        

 

 Neuro  impaired  Neuro/Emot  Resolve  2019    Patricia  



   decision-ma  ion  d    10:45:00    Flowery Branch  



   felicia      10:30:      [id number]  



         00        

 

 Activity  ADL  Activity  Resolve  2019    Patricia  



   assistance    d    10:45:00    Harper  



   required      10:30:      [id number]  



         00        

 

 Activity  self-care  Activity  Resolve  2019    Patricia  



   deficit    d    10:45:00    Harper  



         10:30:      [id number]  



         00        

 

 Safety  structural  Safety  Resolve  2019    Patricia  



   barriers    d    10:55:00    Flowery Branch  



   present      10:30:      [id number]  



         00        

 

 Safety  fall risk  Safety  Resolve  2019    Patricia  



   factor    d    10:55:00    Flowery Branch  



   present      10:30:      [id number]  



         00        

 

 Safety  risk for  Safety  Resolve  2019    Patricia  



   hospitaliza    d    10:55:00    Flowery Branch  



   tion      10:30:      [id number]  



         00        

 

 Safety  can be left  Safety  Active  2019      Patricia  



   alone for            Flowery Branch  



   only short      10:30:      [id number]  



   periods      00        

 

 Safety  safety  Safety  Resolve  2019    QUALITY  



   hazards    d    10:55:00    REALTIME  



   present      10:30:        



         00        

 

 Medication  oral med  Meds  Resolve  2019    Patricia  



   assistance    d    10:55:00    Harper  



   required      10:30:      [id number]  



         00        

 

 Medication  knowledge/s  Meds  Resolve  2019    Patricia  



   kill    d    10:55:00    Flowery Branch  



   deficit: pt      10:30:      [id number]  



         00        

 

 Musculoskel  transfer  Musculoske  Resolve  2019    Patricia  



 etal  assistance  letal  d    10:45:00    Harper  



   required      10:30:      [id number]  



         00        

 

 Musculoskel  requires  Musculoske  Resolve  2019    Patricia  



 etal  human  letal  d    10:45:00    Harper  



   assist to      10:30:      [id number]  



   leave home      00        

 

 Cath/Ostomy  k/s  Cath\Ostom  Resolve  2019    Patricia  



 /GI  deficit:  y\GI Care  d    11:40:00    Flowery Branch  



   cath/ost/GI      10:30:      [id number]  



   care - pt      00        

 

 Balance/End  balance/  PT/OT:  Resolve  2019    Shyam kimance  rdination  Balance/En  d    10:45:00    Kobziewicz  



   deficit  durance    12:07:      [id number]  



         00        

 

 OT: Self  self-care  OT:  Resolve  2019    Shyam  



 Care  deficit  Self-Care  d    10:45:00    Kobziewicz  



         12:07:      [id number]  



         00        

 

 Gait/Locomo  stair  PT/OT:  Resolve  2019    Shyam  



 tion  management  Gait/Locom  d    10:45:00    Kobziewicz  



 problems  req  otion    12:07:      [id number]  



         00        

 

 Gait/Locomo  gait  PT/OT:  Resolve  2019    Shyam  



 tion  deficit  Gait/Locom  d    10:45:00    Kobziewicz  



 problems    otion    12:07:      [id number]  



         00        

 

 Elimination  ostomy  Eliminatio  Resolve  2019    Elmer  



   present  n  d  1-15  10:55:00    Haider  



         09:55:      [id number]  



         00        

 

 Elimination  urinary  Eliminatio  Resolve  2019    Elmer  



   incontinenc  n  d  2-11  10:45:00    Haider gill      10:45:      [id number]  



         00        

 

 Elimination  ostomy  Eliminatio  Resolve  2019    Elmer  



   present  n  d  2-11  10:45:00    Haider  



         10:45:      [id number]  



         00        

 

 Safety  risk for  Safety  Active  2019      Elmer  



   hospitaliza            Haider  



   tion      10:50:      [id number]  



         00        

 

 Activity  ADL  Activity  Unknown  2019      Elmer  



   assistance            Maloney  



   required      10:45:      [id number]  



         00        

 

 Safety  fall risk  Safety  Active  2019      Elmer  



   factor            Maloney  



   present      10:45:      [id number]  



         00        







Allergies, Adverse Reactions, Alerts







 Allergy  Allergy  Status  Severity  Reaction(s)  Onset  Inactive  Treating  
Comments



 Name  Type        Date  Date  Clinician  

 

 Unknown  None  Active  Unknown  None      Unknown  No Known



                 Allergies



                 For This



                 Patient







Medications







 Ordered  Filled  Start  Stop  Current  Ordering  Indication  Dosage  Frequency
  Signature  Comments  Components



 Medication  Medication  Date  Date  Medication?  Clinician        (SIG)    



 Name  Name                    

 

 Eliquis 2.5  Eliquis 2.5  2019    Yes  [facility]    Unknown  Unknown      



 mg tablet  mg tablet        Jen JEFF            

 

 metoprolol  metoprolol  2019    Yes  [facility]    Unknown  Unknown      



 tartrate  tartrate  1-04      Jen JEFF            



 100 mg  100 mg                    



 tablet  tablet                    

 

 Cardizem CD  Cardizem CD  2019    Yes  [facility]    Unknown  Unknown      



 120 mg  120 mg  1-04      Jen JEFF            



 capsule,ext  capsule,ext                    



 ended  ended                    



 release  release                    

 

 multivitami  multivitami  2019    Yes  [facility]    Unknown  Unknown      



 n with  n with  -      Jen JEFF            



 minerals  minerals                    



 tablet  tablet                    

 

 Co Q-10 150  Co Q-10 150  2019    Yes  [facility]    Unknown  Unknown      



 mg capsule  mg capsule  1-04      Jen JEFF            

 

 niacin ER  niacin ER  2019    Yes  [facility]    Unknown  Unknown      



 250 mg  250 mg  -04      Jen JEFF            



 tablet,exte  tablet,exte                    



 nded  nded                    



 release  release                    

 

 iron 27mg  iron 27mg  2019    Yes  [facility]    Unknown  Unknown      



     1-04      Jen JEFF            

 

 pravastatin  pravastatin  2019    Yes  [facility]    Unknown  Unknown      



 40 mg  40 mg  -15      Jen JEFF            



 tablet  tablet                    







Vital Signs







 Vital Name  Observation Time  Observation Value  Comments

 

 SYSTOLIC mm[Hg]  2020 18:09:52  146 mm[Hg] mm[Hg]  Method: Sit

 

 SYSTOLIC mm[Hg]  2019 18:08:41  128 mm[Hg] mm[Hg]  Method: Stand

 

 DIASTOLIC mm[Hg]  2020 18:09:52  72 mm[Hg] mm[Hg]  Method: Sit

 

 DIASTOLIC mm[Hg]  2019 18:08:41  70 mm[Hg] mm[Hg]  Method: Stand

 

 PULSE  2020 18:09:52  76 /min /min  

 

 RESP RATE  2020 18:09:52  16 /min /min  

 

 TEMP  2020 18:09:52  98.1 [degF]  







Procedures

This patient has no known procedures.



Results

This patient has no known results.

## 2020-02-18 NOTE — XMS REPORT
Patient Summary Document

 Created on:2020



Patient:PARISH HOUSE Unknown

Sex:Male

:1940

External Reference #:[id number]





Demographics







 Address  [facility] [place] [address]

 

 Home Phone  [phone]

 

 Preferred Language  English

 

 Marital Status  Unknown

 

 Church Affiliation  Unknown

 

 Race  Unknown

 

 Ethnic Group  Unknown









Author







 Organization  [facility]









Support







 Name  Relationship  Address  Phone

 

 ZI, Unknown Name  NextOfBernardino  Unknown Address  [phone]









Care Team Providers







 Name  Role  Phone

 

 Unavailable  Unavailable  Unavailable









Problems







 Condition  Condition  Condition  Status  Onset  Resolution  Last  Treating  
Comments



 Name  Details  Category    Date  Date  Treatment  Clinician  



             Date    

 

 Hypertensiv  Hypertensiv  Diagnosis  Active        Elmer  



 e heart  e heart            Smith  



 disease  disease            [id number]  



 with heart  with heart              



 failure  failure              

 

 Chronic  Chronic  Diagnosis  Active        Elmer  



 systolic  systolic            [place]  



 (congestive  (congestive            [id number]  



 ) heart  ) heart              



 failure  failure              

 

 Paroxysmal  Paroxysmal  Diagnosis  Active        Elemr  



 atrial  atrial            [place]  



 fibrillatio  fibrillatio            [id number]  



 n  n              

 

 Type 2  Type 2  Diagnosis  Active        Elmer  



 diabetes  diabetes            Smith  



 mellitus  mellitus            [id number]  



 without  without              



 complicatio  complicatio              



 ns  ns              

 

 Diverticuli  Diverticuli  Diagnosis  Active        Elmer  



 tis of  tis of            Smith  



 intestine,  intestine,            [id number]  



 part  part              



 unspecified  unspecified              



 , without  , without              



 perforation  perforation              



 or abscess  or abscess              



 without  without              



 bleeding  bleeding              

 

 Anemia,  Anemia,  Diagnosis  Active        Elmer  



 unspecified  unspecified            [place]  



               [id number]  

 

 Benign  Benign  Diagnosis  Active        Elmer  



 prostatic  prostatic            Smith  



 hyperplasia  hyperplasia            [id number]  



 without  without              



 lower  lower              



 urinary  urinary              



 tract  tract              



 symptoms  symptoms              

 

 Lymphocytop  Lymphocytop  Diagnosis  Active        Elmer  



 enia  enia            [place]  



               [id number]  

 

 Encounter  Encounter  Diagnosis  Active        Elmer  



 for  alka Maloney  



 attention  attention            [id number]  



 to  to              



 colostomy  colostomy              

 

 Long term  Long term  Diagnosis  Active        Elmer  



 (current)  (current)            [place]  



 use of  use of            [id number]  



 anticoagula  anticoagula              



 nts  nts              

 

 Personal  Personal  Diagnosis  Active        Elmer  



 history of  history of            [place]  



 transient  transient            [id number]  



 ischemic  ischemic              



 attack  attack              



 (TIA), and  (TIA), and              



 cerebral  cerebral              



 infarction  infarction              



 without  without              



 residual  residual              



 deficits  deficits              

 

 Presence of  Presence of  Diagnosis  Active        Elmer  



 cardiac  cardiac            [place]  



 pacemaker  pacemaker            [id number]  

 

 Presence of  Presence of  Diagnosis  Active        Elmer  



 prosthetic  prosthetic            Smith  



 heart valve  heart valve            [id number]  

 

 Personal  Personal  Diagnosis  Active        Elmer  



 history of  history of            Smith  



 nicotine  nicotine            [id number]  



 dependence  dependence              

 

 Problems  Problems  Diagnosis  Active        Elmer  



 related to  related to            [place]  



 living  living            [id number]  



 alone  alone              

 

 Pain  frequent  Pain Mgmt  Resolve  2019    Patricia  



   pain    d    10:55:00    [place]  



         10:30:      [id number]  



         00        

 

 Cardio  edema  Cardiovasc  Active  2019      Patricia  



     ular          [place]  



         10:30:      [id number]  



         00        

 

 Cardio  knowledge/s  Cardiovasc  Active  2019      Patricia  



   kill  ular          [place]  



   deficit: pt      10:30:      [id number]  



         00        

 

 Respiratory  dyspnea  Respirator  Resolve  2019    Patricia  



   present  y  d    10:45:00    [place]  



         10:30:      [id number]  



         00        

 

 Endo/Hema  newly  Endo/Hema  Resolve  2019    Patricia  



   diagnosed    d    09:45:00    [place]  



   diabetes      10:30:      [id number]  



         00        

 

 Endo/Hema  diabetic  Endo/Hema  Resolve  2019    Patricia  



   foot care    d    09:45:00    [place]  



         10:30:      [id number]  



         00        

 

 Endo/Hema  anti-coagul  Endo/Hema  Resolve  2019    Patricia  



   ation    d    09:45:00    [place]  



   therapy      10:30:      [id number]  



         00        

 

 Sensory  impaired  Sensory  Resolve  2019    Patricia  



   hearing    d    10:45:00    [place]  



         10:30:      [id number]  



         00        

 

 Integument  skin  Integument  Resolve  2019    Patricia  



   integrity    d    11:40:00    [place]  



   risk      10:30:      [id number]  



         00        

 

 Nutrition  nutritional  Nutrition  Resolve  2019    Patricia  



   restriction    d    11:40:00    [place]  



   s      10:30:      [id number]  



         00        

 

 Elimination  ostomy  Eliminatio  Resolve  2019    Patricia  



   present  n  d    11:40:00    [place]  



         10:30:      [id number]  



         00        

 

 Neuro  confusion  Neuro/Emot  Resolve  2019    Patricia  



   present  ion  d    10:45:00    [place]  



         10:30:      [id number]  



         00        

 

 Neuro  impaired  Neuro/Emot  Resolve  2019    Patricia  



   decision-ma  ion  d    10:45:00    [place]  



   felicia      10:30:      [id number]  



         00        

 

 Activity  ADL  Activity  Resolve  2019    Patricia  



   assistance    d    10:45:00    [place]  



   required      10:30:      [id number]  



         00        

 

 Activity  self-care  Activity  Resolve  2019    Patricia  



   deficit    d    10:45:00    [place]  



         10:30:      [id number]  



         00        

 

 Safety  structural  Safety  Resolve  2019    Patricia  



   barriers    d    10:55:00    [place]  



   present      10:30:      [id number]  



         00        

 

 Safety  fall risk  Safety  Resolve  2019    Patricia  



   factor    d    10:55:00    [place]  



   present      10:30:      [id number]  



         00        

 

 Safety  risk for  Safety  Resolve  2019    Patricia  



   hospitaliza    d    10:55:00    [place]  



   tion      10:30:      [id number]  



         00        

 

 Safety  can be left  Safety  Active  2019      Patricia  



   alone for            [place]  



   only short      10:30:      [id number]  



   periods      00        

 

 Safety  safety  Safety  Resolve  2019    QUALITY  



   hazards    d    10:55:00    REALTIME  



   present      10:30:        



         00        

 

 Medication  oral med  Meds  Resolve  2019    Patricia  



   assistance    d    10:55:00    [place]  



   required      10:30:      [id number]  



         00        

 

 Medication  knowledge/s  Meds  Resolve  2019    Patricia  



   kill    d    10:55:00    [place]  



   deficit: pt      10:30:      [id number]  



         00        

 

 Musculoskel  transfer  Musculoske  Resolve  2019    Patricia  



 etal  assistance  letal  d    10:45:00    [place]  



   required      10:30:      [id number]  



         00        

 

 Musculoskel  requires  Musculoske  Resolve  2019    Patricia  



 etal  human  letal  d    10:45:00    [place]  



   assist to      10:30:      [id number]  



   leave home      00        

 

 Cath/Ostomy  k/s  Cath\Ostom  Resolve  2019    Patricia  



 /GI  deficit:  y\GI Care  d    11:40:00    [place]  



   cath/ost/GI      10:30:      [id number]  



   care - pt      00        

 

 Balance/End  balance/  PT/OT:  Resolve  2019    Shyam  



 urance  rdination  Balance/En  d    10:45:00    Mary Anneewicz  



   deficit  durance    12:07:      [id number]  



         00        

 

 OT: Self  self-care  OT:  Resolve  2019    Shyam  



 Care  deficit  Self-Care  d    10:45:00    [place]  



         12:07:      [id number]  



         00        

 

 Gait/Locomo  stair  PT/OT:  Resolve  2019    Shyam  



 tion  management  Gait/Locom  d    10:45:00    [place]  



 problems  req  otion    12:07:      [id number]  



         00        

 

 Gait/Locomo  gait  PT/OT:  Resolve  2019    Shyam  



 tion  deficit  Gait/Locom  d    10:45:00    [place]  



 problems    otion    12:07:      [id number]  



         00        

 

 Elimination  ostomy  Eliminatio  Resolve  2019    Elmer  



   present  n  d  1-15  10:55:00    [place]  



         09:55:      [id number]  



         00        

 

 Elimination  urinary  Eliminatio  Resolve  2019    Elmer  



   incontinenc  n  d  2-  10:45:00    [place] gill      10:45:      [id number]  



         00        

 

 Elimination  ostomy  Eliminatio  Resolve  2019    Elmer  



   present  n  d  2-11  10:45:00    [place]  



         10:45:      [id number]  



         00        

 

 Safety  risk for  Safety  Active  2019      Elmer  



   hospitaliza            [place]  



   tion      10:50:      [id number]  



         00        

 

 Activity  ADL  Activity  Unknown  2019      Elmer  



   assistance            [place]  



   required      10:45:      [id number]  



         00        

 

 Safety  fall risk  Safety  Active  2019      Elmer  



   factor            [place]  



   present      10:45:      [id number]  



         00        

 

 Sensory  impaired  Sensory  Active  -      [place]  



   hearing            [place]  



         11:30:      [id number]  



         00        

 

 Elimination  urinary  Eliminatio  Active  -0      [place]  



   incontinenc  n          [place]  



   e      11:30:      [id number]  



         00        

 

 Elimination  urinary  Eliminatio  Active        [place]  



   urgency  n          [place]  



         11:30:      [id number]  



         00        

 

 Elimination  ostomy  Eliminatio  Active  -0      [place]  



   present  n          [place]  



         11:30:      [id number]  



         00        

 

 Neuro  impaired  Neuro/Emot  Active        [place]  



   decision-ma  ion          [place]  



   felicia      11:30:      [id number]  



         00        

 

 Safety  structural  Safety  Active        [place]  



   barriers            [place]  



   present      11:30:      [id number]  



         00        

 

 Safety  sanitation  Safety  Active        [place]  



   hazards            [place]  



   present      11:30:      [id number]  



         00        

 

 Musculoskel  requires  Musculoske  Active        [place]  



 etal  human  letal          [place]  



   assist to      11:30:      [id number]  



   leave home      00        







Allergies, Adverse Reactions, Alerts







 Allergy  Allergy  Status  Severity  Reaction(s)  Onset  Inactive  Treating  
Comments



 Name  Type        Date  Date  Clinician  

 

 Unknown  None  Active  Unknown  None      Unknown  No Known



                 Allergies



                 For This



                 Patient







Medications







 Ordered  Filled  Start  Stop  Current  Ordering  Indication  Dosage  Frequency
  Signature  Comments  Components



 Medication  Medication  Date  Date  Medication?  Clinician        (SIG)    



 Name  Name                    

 

 Eliquis 2.5  Eliquis 2.5  2019    Yes  [place]    Unknown  Unknown      



 mg tablet  mg tablet  -04      Jen JEFF            

 

 metoprolol  metoprolol  2019    Yes  [place]    Unknown  Unknown      



 tartrate  tartrate  -04      Jen JEFF            



 100 mg  100 mg                    



 tablet  tablet                    

 

 Cardizem CD  Cardizem CD  2019    Yes  [place]    Unknown  Unknown      



 120 mg  120 mg  1-04      Jen JEFF            



 capsule,ext  capsule,ext                    



 ended  ended                    



 release  release                    

 

 multivitami  multivitami  2019    Yes  [place]    Unknown  Unknown      



 n with  n with  -      Jen JEFF            



 minerals  minerals                    



 tablet  tablet                    

 

 Co Q-10 150  Co Q-10 150  2019    Yes  [place]    Unknown  Unknown      



 mg capsule  mg capsule  1-04      Jen JEFF            

 

 niacin ER  niacin ER  2019    Yes  [place]    Unknown  Unknown      



 250 mg  250 mg  1-04      Jen JEFF            



 tablet,exte  tablet,exte                    



 nded  nded                    



 release  release                    

 

 iron 27mg  iron 27mg  2019    Yes  [place]    Unknown  Unknown      



     1-04      Jen JEFF            

 

 pravastatin  pravastatin  2019    Yes  [place]    Unknown  Unknown      



 40 mg  40 mg  -15      Jen JEFF            



 tablet  tablet                    







Vital Signs







 Vital Name  Observation Time  Observation Value  Comments

 

 SYSTOLIC mm[Hg]  2020 18:10:06  118 mm[Hg] mm[Hg]  Method: Sit

 

 SYSTOLIC mm[Hg]  2019 18:08:41  128 mm[Hg] mm[Hg]  Method: Stand

 

 DIASTOLIC mm[Hg]  2020 18:10:06  64 mm[Hg] mm[Hg]  Method: Sit

 

 DIASTOLIC mm[Hg]  2019 18:08:41  70 mm[Hg] mm[Hg]  Method: Stand

 

 PULSE  2020 18:10:06  64 /min /min  

 

 RESP RATE  2020 18:10:06  16 /min /min  

 

 TEMP  2020 18:10:06  96.9 [degF]  







Procedures

This patient has no known procedures.



Results

This patient has no known results.

## 2020-02-18 NOTE — XMS REPORT
Patient Summary Document

 Created on:2020



Patient:[facility] Unknown

Sex:Male

:1940

External Reference #:[id number]





Demographics







 Address  [facility] [place] [address]

 

 Home Phone  [phone]

 

 Preferred Language  English

 

 Marital Status  Unknown

 

 [employer] Affiliation  Unknown

 

 Race  Unknown

 

 Ethnic Group  Unknown









Author







 Organization  [facility]









Support







 Name  Relationship  Address  Phone

 

 ZI, Unknown Name  NextOfBernardino  Unknown Address  [phone]









Care Team Providers







 Name  Role  Phone

 

 Unavailable  Unavailable  Unavailable









Problems







 Condition  Condition  Condition  Status  Onset  Resolution  Last  Treating  
Comments



 Name  Details  Category    Date  Date  Treatment  Clinician  



             Date    

 

 Hypertensiv  Hypertensiv  Diagnosis  Active        Elmer  



 e heart  e heart            Smith  



 disease  disease            [id number]  



 with heart  with heart              



 failure  failure              

 

 Chronic  Chronic  Diagnosis  Active        Elmer  



 systolic  systolic            [place]  



 (congestive  (congestive            [id number]  



 ) heart  ) heart              



 failure  failure              

 

 Paroxysmal  Paroxysmal  Diagnosis  Active        Elmer  



 atrial  atrial            [place]ider  



 fibrillatio  fibrillatio            [id number]  



 n  n              

 

 Type 2  Type 2  Diagnosis  Active        Elmer  



 diabetes  diabetes            Smith  



 mellitus  mellitus            [id number]  



 without  without              



 complicatio  complicatio              



 ns  ns              

 

 Diverticuli  Diverticuli  Diagnosis  Active        Elmer  



 tis of  tis of            Smith  



 intestine,  intestine,            [id number]  



 part  part              



 unspecified  unspecified              



 , without  , without              



 perforation  perforation              



 or abscess  or abscess              



 without  without              



 bleeding  bleeding              

 

 Anemia,  Anemia,  Diagnosis  Active        Elmer  



 unspecified  unspecified            [place]  



               [id number]  

 

 Benign  Benign  Diagnosis  Active        Elmer  



 prostatic  prostatic            Smith  



 hyperplasia  hyperplasia            [id number]  



 without  without              



 lower  lower              



 urinary  urinary              



 tract  tract              



 symptoms  symptoms              

 

 Lymphocytop  Lymphocytop  Diagnosis  Active        Elmer  



 enia  enia            [place]  



               [id number]  

 

 Encounter  Encounter  Diagnosis  Active        Elmer  



 for  alka Maloney  



 attention  attention            [id number]  



 to  to              



 colostomy  colostomy              

 

 Long term  Long term  Diagnosis  Active        Elmer  



 (current)  (current)            [place]  



 use of  use of            [id number]  



 anticoagula  anticoagula              



 nts  nts              

 

 Personal  Personal  Diagnosis  Active        Elmer  



 history of  history of            [place]  



 transient  transient            [id number]  



 ischemic  ischemic              



 attack  attack              



 (TIA), and  (TIA), and              



 cerebral  cerebral              



 infarction  infarction              



 without  without              



 residual  residual              



 deficits  deficits              

 

 Presence of  Presence of  Diagnosis  Active        Elmer  



 cardiac  cardiac            [place]  



 pacemaker  pacemaker            [id number]  

 

 Presence of  Presence of  Diagnosis  Active        Elmer  



 prosthetic  prosthetic            Smith  



 heart valve  heart valve            [id number]  

 

 Personal  Personal  Diagnosis  Active        Elmer  



 history of  history of            Smith  



 nicotine  nicotine            [id number]  



 dependence  dependence              

 

 Problems  Problems  Diagnosis  Active        Elmer  



 related to  related to            [place]  



 living  living            [id number]  



 alone  alone              

 

 Pain  frequent  Pain Mgmt  Resolve  2019    Patricia  



   pain    d    10:55:00    [place]  



         10:30:      [id number]  



         00        

 

 Cardio  edema  Cardiovasc  Active  2019      Patricia  



     ular          [place]  



         10:30:      [id number]  



         00        

 

 Cardio  knowledge/s  Cardiovasc  Active  2019      Patricia  



   kill  ular          [place]  



   deficit: pt      10:30:      [id number]  



         00        

 

 Respiratory  dyspnea  Respirator  Resolve  2019    Patricia  



   present  y  d    10:45:00    [place]  



         10:30:      [id number]  



         00        

 

 Endo/Hema  newly  Endo/Hema  Resolve  2019    Patricia  



   diagnosed    d    09:45:00    [place]  



   diabetes      10:30:      [id number]  



         00        

 

 Endo/Hema  diabetic  Endo/Hema  Resolve  2019    Patricia  



   foot care    d    09:45:00    [place]  



         10:30:      [id number]  



         00        

 

 Endo/Hema  anti-coagul  Endo/Hema  Resolve  2019    Patricia  



   ation    d    09:45:00    [place]  



   therapy      10:30:      [id number]  



         00        

 

 Sensory  impaired  Sensory  Resolve  2019    Patricia  



   hearing    d    10:45:00    [place]  



         10:30:      [id number]  



         00        

 

 Integument  skin  Integument  Resolve  2019    Patricia  



   integrity    d    11:40:00    [place]  



   risk      10:30:      [id number]  



         00        

 

 Nutrition  nutritional  Nutrition  Resolve  2019    Patricia  



   restriction    d    11:40:00    [place]  



   s      10:30:      [id number]  



         00        

 

 Elimination  ostomy  Eliminatio  Resolve  2019    Patricia  



   present  n  d    11:40:00    [place]  



         10:30:      [id number]  



         00        

 

 Neuro  confusion  Neuro/Emot  Resolve  2019    Patricia  



   present  ion  d    10:45:00    [place]  



         10:30:      [id number]  



         00        

 

 Neuro  impaired  Neuro/Emot  Resolve  2019    Patricia  



   decision-ma  ion  d    10:45:00    [place]  



   felicia      10:30:      [id number]  



         00        

 

 Activity  ADL  Activity  Resolve  2019    Patricia  



   assistance    d    10:45:00    [place]  



   required      10:30:      [id number]  



         00        

 

 Activity  self-care  Activity  Resolve  2019    Patricia  



   deficit    d    10:45:00    [place]  



         10:30:      [id number]  



         00        

 

 Safety  structural  Safety  Resolve  2019    Patricia  



   barriers    d    10:55:00    [place]  



   present      10:30:      [id number]  



         00        

 

 Safety  fall risk  Safety  Resolve  2019    Patricia  



   factor    d    10:55:00    [place]  



   present      10:30:      [id number]  



         00        

 

 Safety  risk for  Safety  Resolve  2019    Patricia  



   hospitaliza    d    10:55:00    [place]  



   tion      10:30:      [id number]  



         00        

 

 Safety  can be left  Safety  Active  2019      Patricia  



   alone for            [place]  



   only short      10:30:      [id number]  



   periods      00        

 

 Safety  safety  Safety  Resolve  2019    QUALITY  



   hazards    d    10:55:00    REALTIME  



   present      10:30:        



         00        

 

 Medication  oral med  Meds  Resolve  2019    Patricia  



   assistance    d    10:55:00    [place]  



   required      10:30:      [id number]  



         00        

 

 Medication  knowledge/s  Meds  Resolve  2019    Patricia  



   kill    d    10:55:00    [place]  



   deficit: pt      10:30:      [id number]  



         00        

 

 Musculoskel  transfer  Musculoske  Resolve  2019    Patricia  



 etal  assistance  letal  d    10:45:00    [place]  



   required      10:30:      [id number]  



         00        

 

 Musculoskel  requires  Musculoske  Resolve  2019    Patricia  



 etal  human  letal  d    10:45:00    [place]  



   assist to      10:30:      [id number]  



   leave home      00        

 

 Cath/Ostomy  k/s  Cath\Ostom  Resolve  2019    Patricia  



 /GI  deficit:  y\GI Care  d    11:40:00    [place]  



   cath/ost/GI      10:30:      [id number]  



   care - pt      00        

 

 Balance/End  balance/  PT/OT:  Resolve  2019    Shyam  



 urance  rdination  Balance/En  d    10:45:00    Mary Anneewicz  



   deficit  durance    12:07:      [id number]  



         00        

 

 OT: Self  self-care  OT:  Resolve  2019    Shyam  



 Care  deficit  Self-Care  d    10:45:00    Kobziewicz  



         12:07:      [id number]  



         00        

 

 Gait/Locomo  stair  PT/OT:  Resolve  2019    Shyam  



 tion  management  Gait/Locom  d    10:45:00    Kobziewicz  



 problems  req  otion    12:07:      [id number]  



         00        

 

 Gait/Locomo  gait  PT/OT:  Resolve  2019    Shyam  



 tion  deficit  Gait/Locom  d    10:45:00    Kobziewicz  



 problems    otion    12:07:      [id number]  



         00        

 

 Elimination  ostomy  Eliminatio  Resolve  2019    Elmer  



   present  n  d  1-15  10:55:00    [place]  



         09:55:      [id number]  



         00        

 

 Elimination  urinary  Eliminatio  Resolve  2019    Elmer  



   incontinenc  n  d  2-  10:45:00    [place] gill      10:45:      [id number]  



         00        

 

 Elimination  ostomy  Eliminatio  Resolve  2019    Elmer  



   present  n  d  2-11  10:45:00    [place]  



         10:45:      [id number]  



         00        

 

 Safety  risk for  Safety  Active  2019      Elmer  



   hospitaliza            [place]  



   tion      10:50:      [id number]  



         00        

 

 Activity  ADL  Activity  Unknown  2019      Elmer  



   assistance            [place]oney  



   required      10:45:      [id number]  



         00        

 

 Safety  fall risk  Safety  Active  2019      Elmer  



   factor            Maloney  



   present      10:45:      [id number]  



         00        

 

 Sensory  impaired  Sensory  Active  -      [place]  



   hearing            [place]  



         11:30:      [id number]  



         00        

 

 Elimination  urinary  Eliminatio  Active  -0      Cherrise  



   incontinenc  n          [place]  



   e      11:30:      [id number]  



         00        

 

 Elimination  urinary  Eliminatio  Active        Cherrise  



   urgency  n          [place]erty  



         11:30:      [id number]  



         00        

 

 Elimination  ostomy  Eliminatio  Active  -0      [place]  



   present  n          [place]  



         11:30:      [id number]  



         00        

 

 Neuro  impaired  Neuro/Emot  Active        Cherrise  



   decision-ma  ion          Lafferty  



   felicia      11:30:      [id number]  



         00        

 

 Safety  structural  Safety  Active        Cherrise  



   barriers            [place]afferty  



   present      11:30:      [id number]  



         00        

 

 Safety  sanitation  Safety  Active        Cherrise  



   hazards            [place]afferty  



   present      11:30:      [id number]  



         00        

 

 Musculoskel  requires  Musculoske  Active        Cherrise  



 etal  human  letal          Lafferty  



   assist to      11:30:      [id number]  



   leave home      00        







Allergies, Adverse Reactions, Alerts







 Allergy  Allergy  Status  Severity  Reaction(s)  Onset  Inactive  Treating  
Comments



 Name  Type        Date  Date  Clinician  

 

 Unknown  None  Active  Unknown  None      Unknown  No Known



                 Allergies



                 For This



                 Patient







Medications







 Ordered  Filled  Start  Stop  Current  Ordering  Indication  Dosage  Frequency
  Signature  Comments  Components



 Medication  Medication  Date  Date  Medication?  Clinician        (SIG)    



 Name  Name                    

 

 Eliquis 2.5  Eliquis 2.5  2019    Yes  [place]    Unknown  Unknown      



 mg tablet  mg tablet  1-04      Jen JEFF            

 

 metoprolol  metoprolol  2019    Yes  [place]    Unknown  Unknown      



 tartrate  tartrate  1-04      Jen JEFF            



 100 mg  100 mg                    



 tablet  tablet                    

 

 Cardizem CD  Cardizem CD  2019    Yes  [place]    Unknown  Unknown      



 120 mg  120 mg  1-04      Jen JEFF            



 capsule,ext  capsule,ext                    



 ended  ended                    



 release  release                    

 

 multivitami  multivitami  2019    Yes  [place]    Unknown  Unknown      



 n with  n with  -      Jen JEFF            



 minerals  minerals                    



 tablet  tablet                    

 

 Co Q-10 150  Co Q-10 150  2019    Yes  [place]    Unknown  Unknown      



 mg capsule  mg capsule  1-04      Jen JEFF            

 

 niacin ER  niacin ER  2019    Yes  [place]    Unknown  Unknown      



 250 mg  250 mg  1-04      Jen JEFF            



 tablet,exte  tablet,exte                    



 nded  nded                    



 release  release                    

 

 iron 27mg  iron 27mg  2019    Yes  [place]    Unknown  Unknown      



     1-04      Jen JEFF            

 

 pravastatin  pravastatin  2019    Yes  [place]    Unknown  Unknown      



 40 mg  40 mg  -15      Jen JEFF            



 tablet  tablet                    







Vital Signs







 Vital Name  Observation Time  Observation Value  Comments

 

 SYSTOLIC mm[Hg]  2020 18:09:59  132 mm[Hg] mm[Hg]  Method: Sit

 

 SYSTOLIC mm[Hg]  2019 18:08:41  128 mm[Hg] mm[Hg]  Method: Stand

 

 DIASTOLIC mm[Hg]  2020 18:09:59  64 mm[Hg] mm[Hg]  Method: Sit

 

 DIASTOLIC mm[Hg]  2019 18:08:41  70 mm[Hg] mm[Hg]  Method: Stand

 

 PULSE  2020 18:09:59  70 /min /min  

 

 RESP RATE  2020 18:09:59  16 /min /min  

 

 TEMP  2020 18:09:59  99.7 [degF]  







Procedures

This patient has no known procedures.



Results

This patient has no known results.